# Patient Record
Sex: MALE | Race: BLACK OR AFRICAN AMERICAN | Employment: OTHER | URBAN - METROPOLITAN AREA
[De-identification: names, ages, dates, MRNs, and addresses within clinical notes are randomized per-mention and may not be internally consistent; named-entity substitution may affect disease eponyms.]

---

## 2019-11-03 ENCOUNTER — HOSPITAL ENCOUNTER (EMERGENCY)
Age: 64
Discharge: HOME | End: 2019-11-03
Attending: EMERGENCY MEDICINE | Admitting: EMERGENCY MEDICINE
Payer: MEDICAID

## 2019-11-03 ENCOUNTER — APPOINTMENT (OUTPATIENT)
Dept: CT IMAGING | Age: 64
End: 2019-11-03
Attending: EMERGENCY MEDICINE
Payer: MEDICAID

## 2019-11-03 VITALS
OXYGEN SATURATION: 99 % | SYSTOLIC BLOOD PRESSURE: 142 MMHG | TEMPERATURE: 99 F | RESPIRATION RATE: 14 BRPM | HEART RATE: 80 BPM | DIASTOLIC BLOOD PRESSURE: 80 MMHG

## 2019-11-03 DIAGNOSIS — K59.00 CONSTIPATION, UNSPECIFIED CONSTIPATION TYPE: Primary | ICD-10-CM

## 2019-11-03 LAB
ALBUMIN SERPL-MCNC: 4.5 G/DL (ref 3.4–5)
ALBUMIN/GLOB SERPL: 1 {RATIO} (ref 0.8–1.7)
ALP SERPL-CCNC: 76 U/L (ref 45–117)
ALT SERPL-CCNC: 33 U/L (ref 16–61)
ANION GAP SERPL CALC-SCNC: 6 MMOL/L (ref 3–18)
APPEARANCE UR: CLEAR
AST SERPL-CCNC: 22 U/L (ref 10–38)
BASOPHILS # BLD: 0 K/UL (ref 0–0.1)
BASOPHILS NFR BLD: 0 % (ref 0–2)
BILIRUB SERPL-MCNC: 0.6 MG/DL (ref 0.2–1)
BILIRUB UR QL: NEGATIVE
BUN SERPL-MCNC: 12 MG/DL (ref 7–18)
BUN/CREAT SERPL: 10 (ref 12–20)
CALCIUM SERPL-MCNC: 9.2 MG/DL (ref 8.5–10.1)
CHLORIDE SERPL-SCNC: 99 MMOL/L (ref 100–111)
CO2 SERPL-SCNC: 31 MMOL/L (ref 21–32)
COLOR UR: YELLOW
CREAT SERPL-MCNC: 1.15 MG/DL (ref 0.6–1.3)
DIFFERENTIAL METHOD BLD: NORMAL
EOSINOPHIL # BLD: 0 K/UL (ref 0–0.4)
EOSINOPHIL NFR BLD: 0 % (ref 0–5)
ERYTHROCYTE [DISTWIDTH] IN BLOOD BY AUTOMATED COUNT: 12.8 % (ref 11.6–14.5)
GLOBULIN SER CALC-MCNC: 4.7 G/DL (ref 2–4)
GLUCOSE SERPL-MCNC: 123 MG/DL (ref 74–99)
GLUCOSE UR STRIP.AUTO-MCNC: NEGATIVE MG/DL
HCT VFR BLD AUTO: 43.8 % (ref 36–48)
HGB BLD-MCNC: 15.7 G/DL (ref 13–16)
HGB UR QL STRIP: NEGATIVE
KETONES UR QL STRIP.AUTO: NEGATIVE MG/DL
LEUKOCYTE ESTERASE UR QL STRIP.AUTO: NEGATIVE
LYMPHOCYTES # BLD: 2.9 K/UL (ref 0.9–3.6)
LYMPHOCYTES NFR BLD: 42 % (ref 21–52)
MCH RBC QN AUTO: 32 PG (ref 24–34)
MCHC RBC AUTO-ENTMCNC: 35.8 G/DL (ref 31–37)
MCV RBC AUTO: 89.2 FL (ref 74–97)
MONOCYTES # BLD: 0.6 K/UL (ref 0.05–1.2)
MONOCYTES NFR BLD: 9 % (ref 3–10)
NEUTS SEG # BLD: 3.4 K/UL (ref 1.8–8)
NEUTS SEG NFR BLD: 49 % (ref 40–73)
NITRITE UR QL STRIP.AUTO: NEGATIVE
PH UR STRIP: 7.5 [PH] (ref 5–8)
PLATELET # BLD AUTO: 276 K/UL (ref 135–420)
PMV BLD AUTO: 10.2 FL (ref 9.2–11.8)
POTASSIUM SERPL-SCNC: 3 MMOL/L (ref 3.5–5.5)
PROT SERPL-MCNC: 9.2 G/DL (ref 6.4–8.2)
PROT UR STRIP-MCNC: NEGATIVE MG/DL
RBC # BLD AUTO: 4.91 M/UL (ref 4.7–5.5)
SODIUM SERPL-SCNC: 136 MMOL/L (ref 136–145)
SP GR UR REFRACTOMETRY: <1.005 (ref 1–1.03)
UROBILINOGEN UR QL STRIP.AUTO: 0.2 EU/DL (ref 0.2–1)
WBC # BLD AUTO: 6.9 K/UL (ref 4.6–13.2)

## 2019-11-03 PROCEDURE — 80053 COMPREHEN METABOLIC PANEL: CPT

## 2019-11-03 PROCEDURE — 81003 URINALYSIS AUTO W/O SCOPE: CPT

## 2019-11-03 PROCEDURE — 99283 EMERGENCY DEPT VISIT LOW MDM: CPT

## 2019-11-03 PROCEDURE — 85025 COMPLETE CBC W/AUTO DIFF WBC: CPT

## 2019-11-03 PROCEDURE — 74011636320 HC RX REV CODE- 636/320: Performed by: EMERGENCY MEDICINE

## 2019-11-03 PROCEDURE — 74177 CT ABD & PELVIS W/CONTRAST: CPT

## 2019-11-03 RX ORDER — LISINOPRIL 5 MG/1
5 TABLET ORAL DAILY
COMMUNITY
End: 2020-01-26

## 2019-11-03 RX ORDER — FACIAL-BODY WIPES
10 EACH TOPICAL DAILY
Qty: 8 EACH | Refills: 0 | Status: SHIPPED | OUTPATIENT
Start: 2019-11-03

## 2019-11-03 RX ORDER — POLYETHYLENE GLYCOL 3350 17 G/17G
17 POWDER, FOR SOLUTION ORAL 2 TIMES DAILY
Qty: 235 G | Refills: 0 | Status: SHIPPED | OUTPATIENT
Start: 2019-11-03

## 2019-11-03 RX ORDER — AMLODIPINE BESYLATE 10 MG/1
10 TABLET ORAL DAILY
COMMUNITY
End: 2020-01-26

## 2019-11-03 RX ADMIN — IOPAMIDOL 100 ML: 612 INJECTION, SOLUTION INTRAVENOUS at 13:02

## 2019-11-03 NOTE — MED STUDENT NOTES
*ATTENTION:  This note has been created by a medical student for educational purposes only. Please do not refer to the content of this note for clinical decision-making, billing, or other purposes. Please see attending physicians note to obtain clinical information on this patient. * 67yo M hx of HTN, presents to ED for 4 day hx of constipation/abdominal pain, which was preceded by 2 days of hard stools. Endorses ability to pass flatus. Abdominal pain is umbilical, and 4-2/08, and constant. Patient has tried gasX and baking powder in water which temporarily relieves pain. Patient has a good appetite, but is fearful of eating because he can not have a BM and has abd pain. Denies Diarrhea, light headedness, headache, chest pain, dyspnea, dysuria, back pain, back trauma, hematochezia or melena.

## 2019-11-03 NOTE — ED TRIAGE NOTES
Pt c/o abd pain and constipation x 4 days. Pt reports last bowel movement was 4 days ago, hard yellow stool per patient. Pt denies blood in stool. Pt denies vomiting.

## 2019-11-03 NOTE — ED PROVIDER NOTES
HPI     65yo M hx of HTN, presents to ED for 4 day hx of moderate constipation/abdominal pain, which was preceded by 2 days of hard stools. Endorses ability to pass flatus. Abdominal pain is umbilical, and 5-2/53, and constant. Patient has tried gasX and baking powder in water which temporarily relieves pain. Patient has a good appetite, but is fearful of eating because he can not have a BM and has abd pain. Denies Diarrhea, light headedness, headache, chest pain, dyspnea, dysuria, back pain, back trauma, hematochezia or melena. Past Medical History:   Diagnosis Date    Hypertension        History reviewed. No pertinent surgical history. History reviewed. No pertinent family history.     Social History     Socioeconomic History    Marital status:      Spouse name: Not on file    Number of children: Not on file    Years of education: Not on file    Highest education level: Not on file   Occupational History    Not on file   Social Needs    Financial resource strain: Not on file    Food insecurity:     Worry: Not on file     Inability: Not on file    Transportation needs:     Medical: Not on file     Non-medical: Not on file   Tobacco Use    Smoking status: Never Smoker   Substance and Sexual Activity    Alcohol use: Never     Frequency: Never    Drug use: Never    Sexual activity: Not on file   Lifestyle    Physical activity:     Days per week: Not on file     Minutes per session: Not on file    Stress: Not on file   Relationships    Social connections:     Talks on phone: Not on file     Gets together: Not on file     Attends Yazidism service: Not on file     Active member of club or organization: Not on file     Attends meetings of clubs or organizations: Not on file     Relationship status: Not on file    Intimate partner violence:     Fear of current or ex partner: Not on file     Emotionally abused: Not on file     Physically abused: Not on file     Forced sexual activity: Not on file   Other Topics Concern    Not on file   Social History Narrative    Not on file         ALLERGIES: Patient has no known allergies. Review of Systems   Constitutional: Positive for appetite change. Negative for chills and fever. HENT: Negative for ear pain and sore throat. Eyes: Negative for pain and visual disturbance. Respiratory: Negative for cough and shortness of breath. Cardiovascular: Negative for chest pain and palpitations. Gastrointestinal: Positive for abdominal pain and constipation. Negative for diarrhea, nausea and vomiting. Genitourinary: Negative for flank pain. Musculoskeletal: Negative for back pain and neck pain. Neurological: Negative for syncope and headaches. Psychiatric/Behavioral: Negative for agitation. The patient is not nervous/anxious. Vitals:    11/03/19 1109 11/03/19 1226   BP: (!) 157/104 142/80   Pulse: 85 80   Resp: 17 14   Temp: 99 °F (37.2 °C)    SpO2: 100% 99%            Physical Exam   Constitutional: He is oriented to person, place, and time. He appears well-developed and well-nourished. Non-toxic appearance. He does not appear ill. No distress. HENT:   Head: Normocephalic and atraumatic. Mouth/Throat: Oropharynx is clear and moist.   Eyes: Pupils are equal, round, and reactive to light. EOM are normal. No scleral icterus. Neck: Neck supple. No tracheal deviation present. Cardiovascular: Regular rhythm and intact distal pulses. No murmur heard. Pulmonary/Chest: Effort normal and breath sounds normal. No respiratory distress. Abdominal: Soft. Normal appearance. There is tenderness (mild LLQ abd TTP). There is no rigidity, no rebound, no guarding, no tenderness at McBurney's point and negative Kwon's sign. Genitourinary: Rectum normal and prostate normal. Rectal exam shows no mass and no tenderness. Musculoskeletal: Normal range of motion. He exhibits no deformity.    Neurological: He is alert and oriented to person, place, and time. No gross neuro deficit   Skin: Skin is warm and dry. Capillary refill takes less than 2 seconds. No rash noted. He is not diaphoretic. Psychiatric: He has a normal mood and affect. Nursing note and vitals reviewed. Labs Reviewed   METABOLIC PANEL, COMPREHENSIVE - Abnormal; Notable for the following components:       Result Value    Potassium 3.0 (*)     Chloride 99 (*)     Glucose 123 (*)     BUN/Creatinine ratio 10 (*)     Protein, total 9.2 (*)     Globulin 4.7 (*)     All other components within normal limits   URINALYSIS W/ RFLX MICROSCOPIC - Abnormal; Notable for the following components:    Specific gravity <1.005 (*)     All other components within normal limits   CBC WITH AUTOMATED DIFF     CT ABD PELV W CONT   Final Result   IMPRESSION:      1. No acute inflammatory process in the abdomen or pelvis. MDM     59-year-old male with 4 days of constipation with associated lower abdominal pain without nausea and vomiting but does report decreased p.o. intake. On exam patient is resting comfortably. He has very minimal tenderness in the left lower quadrant. ED Course as of Nov 05 1210   Nury Nguyen Nov 03, 2019   1252 Normal-appearing rectum, no stool in the vault. Palpable prostate which did not feel enlarged. [KG]      ED Course User Index  [KG] Boby Benavidez R, DO     Bowel regimen discussed prior to discharge with scripts given for miralax and bisacodyl suppositories. No significant abnormalities on work-up. Pt w/o complaint on re-eval.     Patient has no new complaints, changes, or physical findings. Diagnostic studies if preformed were reviewed with the patient and/or family. All questions and concerns were addressed. Care plan was outlined, including follow-up with PCP/specialist and return precautions were discussed. Patient is felt to be stable for discharge at this time. Procedures      ICD-10-CM ICD-9-CM    1.  Constipation, unspecified constipation type K59.00 564.00        Dispo: Home

## 2019-11-03 NOTE — DISCHARGE INSTRUCTIONS
Patient Education        Constipation: Care Instructions  Your Care Instructions    Constipation means that you have a hard time passing stools (bowel movements). People pass stools from 3 times a day to once every 3 days. What is normal for you may be different. Constipation may occur with pain in the rectum and cramping. The pain may get worse when you try to pass stools. Sometimes there are small amounts of bright red blood on toilet paper or the surface of stools. This is because of enlarged veins near the rectum (hemorrhoids). A few changes in your diet and lifestyle may help you avoid ongoing constipation. Your doctor may also prescribe medicine to help loosen your stool. Some medicines can cause constipation. These include pain medicines and antidepressants. Tell your doctor about all the medicines you take. Your doctor may want to make a medicine change to ease your symptoms. Follow-up care is a key part of your treatment and safety. Be sure to make and go to all appointments, and call your doctor if you are having problems. It's also a good idea to know your test results and keep a list of the medicines you take. How can you care for yourself at home? · Drink plenty of fluids, enough so that your urine is light yellow or clear like water. If you have kidney, heart, or liver disease and have to limit fluids, talk with your doctor before you increase the amount of fluids you drink. · Include high-fiber foods in your diet each day. These include fruits, vegetables, beans, and whole grains. · Get at least 30 minutes of exercise on most days of the week. Walking is a good choice. You also may want to do other activities, such as running, swimming, cycling, or playing tennis or team sports. · Take a fiber supplement, such as Citrucel or Metamucil, every day. Read and follow all instructions on the label. · Schedule time each day for a bowel movement. A daily routine may help.  Take your time having your bowel movement. · Support your feet with a small step stool when you sit on the toilet. This helps flex your hips and places your pelvis in a squatting position. · Your doctor may recommend an over-the-counter laxative to relieve your constipation. Examples are Milk of Magnesia and MiraLax. Read and follow all instructions on the label. Do not use laxatives on a long-term basis. When should you call for help? Call your doctor now or seek immediate medical care if:    · You have new or worse belly pain.     · You have new or worse nausea or vomiting.     · You have blood in your stools.    Watch closely for changes in your health, and be sure to contact your doctor if:    · Your constipation is getting worse.     · You do not get better as expected. Where can you learn more? Go to http://иван-david.info/. Enter 21 [phone] in the search box to learn more about \"Constipation: Care Instructions. \"  Current as of: June 26, 2019  Content Version: 12.2  © 5855-6740 [email or web address]. Care instructions adapted under license by [employer] (which disclaims liability or warranty for this information). If you have questions about a medical condition or this instruction, always ask your healthcare professional. [address] any warranty or liability for your use of this information.

## 2020-01-26 ENCOUNTER — HOSPITAL ENCOUNTER (EMERGENCY)
Age: 65
Discharge: HOME | End: 2020-01-26
Attending: EMERGENCY MEDICINE
Payer: MEDICAID

## 2020-01-26 VITALS
RESPIRATION RATE: 16 BRPM | OXYGEN SATURATION: 100 % | DIASTOLIC BLOOD PRESSURE: 111 MMHG | HEART RATE: 80 BPM | TEMPERATURE: 98.6 F | SYSTOLIC BLOOD PRESSURE: 152 MMHG

## 2020-01-26 DIAGNOSIS — Z76.0 MEDICATION REFILL: Primary | ICD-10-CM

## 2020-01-26 PROCEDURE — 99283 EMERGENCY DEPT VISIT LOW MDM: CPT

## 2020-01-26 RX ORDER — LISINOPRIL 5 MG/1
5 TABLET ORAL DAILY
Qty: 10 TAB | Refills: 0 | Status: SHIPPED | OUTPATIENT
Start: 2020-01-26

## 2020-01-26 RX ORDER — AMLODIPINE BESYLATE 10 MG/1
10 TABLET ORAL DAILY
Qty: 10 TAB | Refills: 0 | Status: SHIPPED | OUTPATIENT
Start: 2020-01-26

## 2020-01-26 NOTE — ED PROVIDER NOTES
[facility] EMERGENCY DEPT        Mr. Marcie Johnston is a 71-year-old male with past medical history of hypertension who presents for blood pressure medication refill. He is out of his Prinivil and Norvasc. His last dose was this morning. He said he does not have a primary care doctor that he regularly sees for the medication as he just moved to this area. He denies any chest pain, any shortness of breath, any headaches, any dizziness, at this time he is completely asymptomatic and just wants to have a refill for the medications. Patient is here for a blood pressure medication refill. He took his last medication this morning. He does not have a PCM who pre    Nursing nurses regarding the HPI and triage nursing notes were reviewed. No current facility-administered medications for this encounter. Current Outpatient Medications   Medication Sig    amLODIPine (NORVASC) 10 mg tablet Take 1 Tab by mouth daily.  lisinopril (PRINIVIL, ZESTRIL) 5 mg tablet Take 1 Tab by mouth daily.  polyethylene glycol (MIRALAX) 17 gram/dose powder Take 17 g by mouth two (2) times a day. 1 tablespoon with 8 oz of water daily    bisacodyl (BISCOLAX) 10 mg suppository Insert 10 mg into rectum daily. Past Medical History:   Diagnosis Date    Hypertension        History reviewed. No pertinent surgical history. History reviewed. No pertinent family history.     Social History     Socioeconomic History    Marital status:      Spouse name: Not on file    Number of children: Not on file    Years of education: Not on file    Highest education level: Not on file   Occupational History    Not on file   Social Needs    Financial resource strain: Not on file    Food insecurity:     Worry: Not on file     Inability: Not on file    Transportation needs:     Medical: Not on file     Non-medical: Not on file   Tobacco Use    Smoking status: Never Smoker   Substance and Sexual Activity    Alcohol use: Never Frequency: Never    Drug use: Never    Sexual activity: Not on file   Lifestyle    Physical activity:     Days per week: Not on file     Minutes per session: Not on file    Stress: Not on file   Relationships    Social connections:     Talks on phone: Not on file     Gets together: Not on file     Attends Taoist service: Not on file     Active member of club or organization: Not on file     Attends meetings of clubs or organizations: Not on file     Relationship status: Not on file    Intimate partner violence:     Fear of current or ex partner: Not on file     Emotionally abused: Not on file     Physically abused: Not on file     Forced sexual activity: Not on file   Other Topics Concern    Not on file   Social History Narrative    Not on file       No Known Allergies    Patient's primary care provider (as noted in EPIC):  None    Review of Systems   Respiratory: Negative. Cardiovascular: Negative. Neurological: Negative. All other systems reviewed and are negative. Visit Vitals  BP (!) 152/111   Pulse 80   Temp 98.6 °F (37 °C)   Resp 16   SpO2 100%       PHYSICAL EXAM:    CONSTITUTIONAL:  Alert, in no apparent distress;  well developed;  well nourished. HEAD:  Normocephalic, atraumatic. EYES:  EOMI. Non-icteric sclera. Normal conjunctiva. ENTM:  Nose:  no rhinorrhea. Throat:  no erythema or exudate, mucous membranes moist.  NECK:  No JVD. Supple  RESPIRATORY:  Chest clear, equal breath sounds, good air movement. CARDIOVASCULAR:  Regular rate and rhythm. No murmurs, rubs, or gallops. GI:  Normal bowel sounds, abdomen soft and non-tender. No rebound or guarding. BACK:  Non-tender. UPPER EXT:  Normal inspection. LOWER EXT:  No edema, no calf tenderness. Distal pulses intact. NEURO:  Moves all four extremities, and grossly normal motor exam.  SKIN:  No rashes;  Normal for age. PSYCH:  Alert and normal affect.     Abnormal lab results from this emergency department encounter:  Labs Reviewed - No data to display    Lab values for this patient within approximately the last 12 hours:  No results found for this or any previous visit (from the past 12 hour(s)). Radiologist and cardiologist interpretations if available at time of this note:  No results found. Medication(s) ordered for patient during this emergency visit encounter:  Medications - No data to display    [address]:  Based upon the patient's presentation with noted HPI and PE, along with the work up done in the emergency department, the patient is seeking a medication refill. In addition I did explain to the patient I will give him 10 doses of the medication he would need to follow-up with a primary care doctor this week to receive any more medication. I did give him the recommendation of 2 different clinics to follow-up with. DIAGNOSIS:  1. Medication refill    SPECIFIC PATIENT INSTRUCTIONS FROM THE PHYSICIAN WHO TREATED YOU IN THE ER TODAY:  1. Return if any concerns or worsening of condition(s). 2.  Take the medication(s) as prescribed. 3.  FOLLOW UP APPOINTMENT:  Your primary doctor in 1-2 weeks. 4.  Do not run out of your medications that have been prescribed to you. When you have only a couple weeks of on a medication remaining before you run out of it, then call the physician who prescribed it to allow enough time for the your physician to rewrite for the prescription (before you run out of it). Patient is improved, resting quietly and comfortably. The patient will be discharged home. The patient was reassured that these symptoms do not appear to represent a serious or life threatening condition at this time. Warning signs of worsening condition were discussed and understood by the patient. Based on patient's age, coexisting illness, exam, and the results of this ED evaluation, the decision to treat as an outpatient was made.  Based on the information available at time of discharge, acute pathology requiring immediate intervention was deemed relative unlikely. While it is impossible to completely exclude the possibility of underlying serious disease or worsening of condition, I feel the relative likelihood is extremely low. I discussed this uncertainty with the patient, who understood ED evaluation and treatment and felt comfortable with the outpatient treatment plan. All questions regarding care, test results, and follow up were answered. The patient is stable and appropriate to discharge. They understand that they should return to the emergency department for any new or worsening symptoms. I stressed the importance of follow up for repeat assessment and possibly further evaluation/treatment. Dictation disclaimer:  Please note that this dictation was completed with [employer], the computer voice recognition software. Quite often unanticipated grammatical, syntax, homophones, and other interpretive errors are inadvertently transcribed by the computer software. Please disregard these errors. Please excuse any errors that have escaped final proofreading. Coding Diagnoses     Clinical Impression:   1.  Medication refill        Disposition     Disposition:  Home    Franki William PA-C.

## 2022-02-27 ENCOUNTER — APPOINTMENT (OUTPATIENT)
Dept: MRI IMAGING | Age: 67
End: 2022-02-27
Attending: STUDENT IN AN ORGANIZED HEALTH CARE EDUCATION/TRAINING PROGRAM
Payer: MEDICARE

## 2022-02-27 ENCOUNTER — APPOINTMENT (OUTPATIENT)
Dept: CT IMAGING | Age: 67
End: 2022-02-27
Attending: EMERGENCY MEDICINE
Payer: MEDICARE

## 2022-02-27 ENCOUNTER — APPOINTMENT (OUTPATIENT)
Dept: GENERAL RADIOLOGY | Age: 67
End: 2022-02-27
Attending: STUDENT IN AN ORGANIZED HEALTH CARE EDUCATION/TRAINING PROGRAM
Payer: MEDICARE

## 2022-02-27 ENCOUNTER — HOSPITAL ENCOUNTER (OUTPATIENT)
Age: 67
Setting detail: OBSERVATION
Discharge: HOME HEALTH | End: 2022-02-28
Attending: STUDENT IN AN ORGANIZED HEALTH CARE EDUCATION/TRAINING PROGRAM | Admitting: INTERNAL MEDICINE
Payer: MEDICARE

## 2022-02-27 DIAGNOSIS — R42 LIGHTHEADEDNESS: Primary | ICD-10-CM

## 2022-02-27 DIAGNOSIS — R53.1 WEAKNESS: ICD-10-CM

## 2022-02-27 LAB
ALBUMIN SERPL-MCNC: 3.4 G/DL (ref 3.4–5)
ALBUMIN/GLOB SERPL: 1 {RATIO} (ref 0.8–1.7)
ALP SERPL-CCNC: 68 U/L (ref 45–117)
ALT SERPL-CCNC: 33 U/L (ref 16–61)
ANION GAP SERPL CALC-SCNC: 9 MMOL/L (ref 3–18)
APPEARANCE UR: CLEAR
AST SERPL-CCNC: 29 U/L (ref 10–38)
ATRIAL RATE: 78 BPM
BASE DEFICIT BLDV-SCNC: 1 MMOL/L
BASOPHILS # BLD: 0 K/UL (ref 0–0.1)
BASOPHILS NFR BLD: 0 % (ref 0–2)
BILIRUB SERPL-MCNC: 0.4 MG/DL (ref 0.2–1)
BILIRUB UR QL: NEGATIVE
BUN SERPL-MCNC: 25 MG/DL (ref 7–18)
BUN/CREAT SERPL: 20 (ref 12–20)
CALCIUM SERPL-MCNC: 9.2 MG/DL (ref 8.5–10.1)
CALCULATED P AXIS, ECG09: 68 DEGREES
CALCULATED R AXIS, ECG10: 38 DEGREES
CALCULATED T AXIS, ECG11: -145 DEGREES
CHLORIDE SERPL-SCNC: 99 MMOL/L (ref 100–111)
CO2 SERPL-SCNC: 25 MMOL/L (ref 21–32)
COLOR UR: YELLOW
CREAT SERPL-MCNC: 1.26 MG/DL (ref 0.6–1.3)
DIAGNOSIS, 93000: NORMAL
DIFFERENTIAL METHOD BLD: ABNORMAL
EOSINOPHIL # BLD: 0 K/UL (ref 0–0.4)
EOSINOPHIL NFR BLD: 0 % (ref 0–5)
ERYTHROCYTE [DISTWIDTH] IN BLOOD BY AUTOMATED COUNT: 11.3 % (ref 11.6–14.5)
GLOBULIN SER CALC-MCNC: 3.5 G/DL (ref 2–4)
GLUCOSE BLD STRIP.AUTO-MCNC: 182 MG/DL (ref 70–110)
GLUCOSE BLD STRIP.AUTO-MCNC: 265 MG/DL (ref 70–110)
GLUCOSE BLD STRIP.AUTO-MCNC: 308 MG/DL (ref 70–110)
GLUCOSE BLD STRIP.AUTO-MCNC: 402 MG/DL (ref 70–110)
GLUCOSE BLD STRIP.AUTO-MCNC: 435 MG/DL (ref 70–110)
GLUCOSE BLD STRIP.AUTO-MCNC: 446 MG/DL (ref 70–110)
GLUCOSE SERPL-MCNC: 351 MG/DL (ref 74–99)
GLUCOSE UR STRIP.AUTO-MCNC: >1000 MG/DL
HCO3 BLDV-SCNC: 23.9 MMOL/L (ref 23–28)
HCT VFR BLD AUTO: 40.4 % (ref 36–48)
HGB BLD-MCNC: 13.9 G/DL (ref 13–16)
HGB UR QL STRIP: NEGATIVE
IMM GRANULOCYTES # BLD AUTO: 0 K/UL (ref 0–0.04)
IMM GRANULOCYTES NFR BLD AUTO: 0 % (ref 0–0.5)
KETONES UR QL STRIP.AUTO: ABNORMAL MG/DL
LEUKOCYTE ESTERASE UR QL STRIP.AUTO: NEGATIVE
LYMPHOCYTES # BLD: 1.7 K/UL (ref 0.9–3.6)
LYMPHOCYTES NFR BLD: 36 % (ref 21–52)
MAGNESIUM SERPL-MCNC: 1.9 MG/DL (ref 1.6–2.6)
MCH RBC QN AUTO: 30.1 PG (ref 24–34)
MCHC RBC AUTO-ENTMCNC: 34.4 G/DL (ref 31–37)
MCV RBC AUTO: 87.4 FL (ref 78–100)
MONOCYTES # BLD: 0.4 K/UL (ref 0.05–1.2)
MONOCYTES NFR BLD: 8 % (ref 3–10)
NEUTS SEG # BLD: 2.6 K/UL (ref 1.8–8)
NEUTS SEG NFR BLD: 55 % (ref 40–73)
NITRITE UR QL STRIP.AUTO: NEGATIVE
NRBC # BLD: 0 K/UL (ref 0–0.01)
NRBC BLD-RTO: 0 PER 100 WBC
P-R INTERVAL, ECG05: 164 MS
PCO2 BLDV: 39.7 MMHG (ref 41–51)
PH BLDV: 7.39 [PH] (ref 7.32–7.42)
PH UR STRIP: 5 [PH] (ref 5–8)
PHOSPHATE SERPL-MCNC: 3.5 MG/DL (ref 2.5–4.9)
PLATELET # BLD AUTO: 306 K/UL (ref 135–420)
PMV BLD AUTO: 10.5 FL (ref 9.2–11.8)
PO2 BLDV: 29 MMHG (ref 25–40)
POTASSIUM SERPL-SCNC: 3.6 MMOL/L (ref 3.5–5.5)
PROT SERPL-MCNC: 6.9 G/DL (ref 6.4–8.2)
PROT UR STRIP-MCNC: NEGATIVE MG/DL
Q-T INTERVAL, ECG07: 410 MS
QRS DURATION, ECG06: 88 MS
QTC CALCULATION (BEZET), ECG08: 467 MS
RBC # BLD AUTO: 4.62 M/UL (ref 4.35–5.65)
SAO2 % BLDV: 53.7 % (ref 65–88)
SERVICE CMNT-IMP: ABNORMAL
SODIUM SERPL-SCNC: 133 MMOL/L (ref 136–145)
SP GR UR REFRACTOMETRY: 1.03 (ref 1–1.03)
SPECIMEN TYPE: ABNORMAL
TROPONIN-HIGH SENSITIVITY: 17 NG/L (ref 0–78)
UROBILINOGEN UR QL STRIP.AUTO: 0.2 EU/DL (ref 0.2–1)
VENTRICULAR RATE, ECG03: 78 BPM
WBC # BLD AUTO: 4.8 K/UL (ref 4.6–13.2)

## 2022-02-27 PROCEDURE — 84484 ASSAY OF TROPONIN QUANT: CPT

## 2022-02-27 PROCEDURE — 72157 MRI CHEST SPINE W/O & W/DYE: CPT

## 2022-02-27 PROCEDURE — 70496 CT ANGIOGRAPHY HEAD: CPT

## 2022-02-27 PROCEDURE — 83036 HEMOGLOBIN GLYCOSYLATED A1C: CPT

## 2022-02-27 PROCEDURE — 80053 COMPREHEN METABOLIC PANEL: CPT

## 2022-02-27 PROCEDURE — 74011636637 HC RX REV CODE- 636/637: Performed by: STUDENT IN AN ORGANIZED HEALTH CARE EDUCATION/TRAINING PROGRAM

## 2022-02-27 PROCEDURE — 74011000636 HC RX REV CODE- 636: Performed by: EMERGENCY MEDICINE

## 2022-02-27 PROCEDURE — APPSS60 APP SPLIT SHARED TIME 46-60 MINUTES: Performed by: NURSE PRACTITIONER

## 2022-02-27 PROCEDURE — 82962 GLUCOSE BLOOD TEST: CPT

## 2022-02-27 PROCEDURE — 84100 ASSAY OF PHOSPHORUS: CPT

## 2022-02-27 PROCEDURE — 74011250636 HC RX REV CODE- 250/636: Performed by: STUDENT IN AN ORGANIZED HEALTH CARE EDUCATION/TRAINING PROGRAM

## 2022-02-27 PROCEDURE — 74011636637 HC RX REV CODE- 636/637: Performed by: INTERNAL MEDICINE

## 2022-02-27 PROCEDURE — 93005 ELECTROCARDIOGRAM TRACING: CPT

## 2022-02-27 PROCEDURE — 99223 1ST HOSP IP/OBS HIGH 75: CPT | Performed by: INTERNAL MEDICINE

## 2022-02-27 PROCEDURE — 82803 BLOOD GASES ANY COMBINATION: CPT

## 2022-02-27 PROCEDURE — A9577 INJ MULTIHANCE: HCPCS | Performed by: EMERGENCY MEDICINE

## 2022-02-27 PROCEDURE — 74011000250 HC RX REV CODE- 250: Performed by: NURSE PRACTITIONER

## 2022-02-27 PROCEDURE — 85025 COMPLETE CBC W/AUTO DIFF WBC: CPT

## 2022-02-27 PROCEDURE — G0378 HOSPITAL OBSERVATION PER HR: HCPCS

## 2022-02-27 PROCEDURE — 71045 X-RAY EXAM CHEST 1 VIEW: CPT

## 2022-02-27 PROCEDURE — 72158 MRI LUMBAR SPINE W/O & W/DYE: CPT

## 2022-02-27 PROCEDURE — 65660000000 HC RM CCU STEPDOWN

## 2022-02-27 PROCEDURE — 99285 EMERGENCY DEPT VISIT HI MDM: CPT

## 2022-02-27 PROCEDURE — 83735 ASSAY OF MAGNESIUM: CPT

## 2022-02-27 PROCEDURE — 94762 N-INVAS EAR/PLS OXIMTRY CONT: CPT

## 2022-02-27 PROCEDURE — 81003 URINALYSIS AUTO W/O SCOPE: CPT

## 2022-02-27 PROCEDURE — 96374 THER/PROPH/DIAG INJ IV PUSH: CPT

## 2022-02-27 PROCEDURE — 74011250636 HC RX REV CODE- 250/636: Performed by: EMERGENCY MEDICINE

## 2022-02-27 PROCEDURE — 70553 MRI BRAIN STEM W/O & W/DYE: CPT

## 2022-02-27 RX ORDER — METFORMIN HYDROCHLORIDE 500 MG/1
500 TABLET, EXTENDED RELEASE ORAL DAILY
COMMUNITY
Start: 2022-02-08

## 2022-02-27 RX ORDER — SODIUM CHLORIDE 0.9 % (FLUSH) 0.9 %
5-40 SYRINGE (ML) INJECTION EVERY 8 HOURS
Status: DISCONTINUED | OUTPATIENT
Start: 2022-02-27 | End: 2022-02-28 | Stop reason: HOSPADM

## 2022-02-27 RX ORDER — ENOXAPARIN SODIUM 100 MG/ML
40 INJECTION SUBCUTANEOUS DAILY
Status: DISCONTINUED | OUTPATIENT
Start: 2022-02-28 | End: 2022-02-28 | Stop reason: HOSPADM

## 2022-02-27 RX ORDER — MAGNESIUM SULFATE 100 %
4 CRYSTALS MISCELLANEOUS AS NEEDED
Status: DISCONTINUED | OUTPATIENT
Start: 2022-02-27 | End: 2022-02-28 | Stop reason: HOSPADM

## 2022-02-27 RX ORDER — INSULIN GLARGINE 100 [IU]/ML
15 INJECTION, SOLUTION SUBCUTANEOUS DAILY
Status: DISCONTINUED | OUTPATIENT
Start: 2022-02-28 | End: 2022-02-27

## 2022-02-27 RX ORDER — SENNOSIDES 8.6 MG/1
8.6 TABLET ORAL
COMMUNITY
Start: 2022-02-26

## 2022-02-27 RX ORDER — ACETAMINOPHEN 650 MG/1
650 SUPPOSITORY RECTAL
Status: DISCONTINUED | OUTPATIENT
Start: 2022-02-27 | End: 2022-02-28 | Stop reason: HOSPADM

## 2022-02-27 RX ORDER — INSULIN LISPRO 100 [IU]/ML
INJECTION, SOLUTION INTRAVENOUS; SUBCUTANEOUS
Status: DISCONTINUED | OUTPATIENT
Start: 2022-02-27 | End: 2022-02-28 | Stop reason: HOSPADM

## 2022-02-27 RX ORDER — INSULIN LISPRO 100 [IU]/ML
5 INJECTION, SOLUTION INTRAVENOUS; SUBCUTANEOUS ONCE
Status: COMPLETED | OUTPATIENT
Start: 2022-02-27 | End: 2022-02-27

## 2022-02-27 RX ORDER — ACETAMINOPHEN 325 MG/1
650 TABLET ORAL
Status: DISCONTINUED | OUTPATIENT
Start: 2022-02-27 | End: 2022-02-28 | Stop reason: HOSPADM

## 2022-02-27 RX ORDER — SODIUM CHLORIDE 9 MG/ML
100 INJECTION, SOLUTION INTRAVENOUS CONTINUOUS
Status: DISCONTINUED | OUTPATIENT
Start: 2022-02-27 | End: 2022-02-28

## 2022-02-27 RX ORDER — ONDANSETRON 4 MG/1
4 TABLET, ORALLY DISINTEGRATING ORAL
Status: DISCONTINUED | OUTPATIENT
Start: 2022-02-27 | End: 2022-02-28 | Stop reason: HOSPADM

## 2022-02-27 RX ORDER — SODIUM CHLORIDE 0.9 % (FLUSH) 0.9 %
5-40 SYRINGE (ML) INJECTION AS NEEDED
Status: DISCONTINUED | OUTPATIENT
Start: 2022-02-27 | End: 2022-02-28 | Stop reason: HOSPADM

## 2022-02-27 RX ORDER — POLYETHYLENE GLYCOL 3350 17 G/17G
17 POWDER, FOR SOLUTION ORAL DAILY PRN
Status: DISCONTINUED | OUTPATIENT
Start: 2022-02-27 | End: 2022-02-28 | Stop reason: HOSPADM

## 2022-02-27 RX ORDER — INSULIN GLARGINE 100 [IU]/ML
15 INJECTION, SOLUTION SUBCUTANEOUS DAILY
Status: DISCONTINUED | OUTPATIENT
Start: 2022-02-27 | End: 2022-02-28 | Stop reason: HOSPADM

## 2022-02-27 RX ORDER — DEXTROSE MONOHYDRATE 100 MG/ML
0-250 INJECTION, SOLUTION INTRAVENOUS AS NEEDED
Status: DISCONTINUED | OUTPATIENT
Start: 2022-02-27 | End: 2022-02-28 | Stop reason: HOSPADM

## 2022-02-27 RX ORDER — ONDANSETRON 2 MG/ML
4 INJECTION INTRAMUSCULAR; INTRAVENOUS
Status: DISCONTINUED | OUTPATIENT
Start: 2022-02-27 | End: 2022-02-28 | Stop reason: HOSPADM

## 2022-02-27 RX ADMIN — SODIUM CHLORIDE, PRESERVATIVE FREE 10 ML: 5 INJECTION INTRAVENOUS at 22:53

## 2022-02-27 RX ADMIN — IOPAMIDOL 100 ML: 755 INJECTION, SOLUTION INTRAVENOUS at 20:56

## 2022-02-27 RX ADMIN — SODIUM CHLORIDE, POTASSIUM CHLORIDE, SODIUM LACTATE AND CALCIUM CHLORIDE 1000 ML: 600; 310; 30; 20 INJECTION, SOLUTION INTRAVENOUS at 09:35

## 2022-02-27 RX ADMIN — GADOBENATE DIMEGLUMINE 10 ML: 529 INJECTION, SOLUTION INTRAVENOUS at 17:02

## 2022-02-27 RX ADMIN — Medication 10 UNITS: at 22:17

## 2022-02-27 RX ADMIN — INSULIN LISPRO 5 UNITS: 100 INJECTION, SOLUTION INTRAVENOUS; SUBCUTANEOUS at 11:07

## 2022-02-27 NOTE — ED PROVIDER NOTES
EMERGENCY DEPARTMENT HISTORY AND PHYSICAL EXAM    10:06 AM    Date: 2/27/2022  Patient Name: Amanda Mason    History of Presenting Illness     Chief Complaint   Patient presents with    Fatigue       History Provided By: Patient  Location/Duration/Severity/Modifying factors   Patient is a 80-year-old male with a history of diabetes with recent admission to outside facility for DKA secondary to noncompliance (A1c 19.9), hypertension, and prostate cancer (not currently undergoing any chemotherapy or radiation treatment). Patient visited with primary complaint of sudden onset of weakness, patient states he woke this morning feeling generally weak with no focal weakness, difficulty speaking, or facial droop. Patient also denies any recent fever/chills, diarrhea, abdominal pain, nausea/vomiting, or syncope. PCP: Frantz Avilez MD    Current Facility-Administered Medications   Medication Dose Route Frequency Provider Last Rate Last Admin    lactated ringers bolus infusion 1,000 mL  1,000 mL IntraVENous ONCE Concepción Roman MD         Current Outpatient Medications   Medication Sig Dispense Refill    Erleada 60 mg tablet TAKE 4 TABLETS (240MG) BY  MOUTH ONCE DAILY WITH OR  WITHOUT FOOD 120 Tablet 2    ergocalciferol (ERGOCALCIFEROL) 1,250 mcg (50,000 unit) capsule Take 1 Capsule by mouth every seven (7) days. 8 Capsule 0    spironolactone (ALDACTONE) 25 mg tablet Take 25 mg by mouth daily.  indapamide (LOZOL) 2.5 mg tablet Take 2.5 mg by mouth Every morning.  diclofenac (VOLTAREN) 1 % gel Apply 2 g to affected area four (4) times daily.  amLODIPine (NORVASC) 10 mg tablet Take 1 Tab by mouth daily. 10 Tab 0    lisinopril (PRINIVIL, ZESTRIL) 5 mg tablet Take 1 Tab by mouth daily. 10 Tab 0    polyethylene glycol (MIRALAX) 17 gram/dose powder Take 17 g by mouth two (2) times a day.  1 tablespoon with 8 oz of water daily 235 g 0    bisacodyl (BISCOLAX) 10 mg suppository Insert 10 mg into rectum daily. 8 Each 0       Past History     Past Medical History:  Past Medical History:   Diagnosis Date    HTN (hypertension)     Hypertension        Past Surgical History:  Past Surgical History:   Procedure Laterality Date    HX OTHER SURGICAL  09/01/2020    L1 Vertebral Body Bx       Family History:  No family history on file. Social History:  Social History     Tobacco Use    Smoking status: Never Smoker    Smokeless tobacco: Never Used   Substance Use Topics    Alcohol use: Never    Drug use: Never       Allergies:  No Known Allergies    I reviewed and confirmed the above information with patient and updated as necessary. Review of Systems     Review of Systems   Constitutional: Negative for chills and fever. HENT: Negative for rhinorrhea and sore throat. Eyes: Negative for discharge and redness. Respiratory: Negative for cough and shortness of breath. Cardiovascular: Negative for chest pain and leg swelling. Gastrointestinal: Negative for abdominal pain, diarrhea, nausea and vomiting. Genitourinary: Negative for difficulty urinating and dysuria. Musculoskeletal: Negative for back pain and neck pain. Skin: Negative for rash and wound. Neurological: Positive for weakness. Negative for syncope, light-headedness and headaches. Physical Exam     Visit Vitals  Pulse 87   Temp 98.5 °F (36.9 °C)   Resp 13   Ht 5' 7\" (1.702 m)   Wt 59.4 kg (131 lb)   SpO2 98%   BMI 20.52 kg/m²       Physical Exam  Constitutional:       General: He is not in acute distress. Appearance: He is not ill-appearing, toxic-appearing or diaphoretic. HENT:      Head: Atraumatic. Right Ear: External ear normal.      Left Ear: External ear normal.      Nose: No congestion or rhinorrhea. Mouth/Throat:      Mouth: Mucous membranes are moist.      Pharynx: No oropharyngeal exudate or posterior oropharyngeal erythema. Eyes:      General: No visual field deficit.         Right eye: No discharge. Left eye: No discharge. Pupils: Pupils are equal, round, and reactive to light. Cardiovascular:      Rate and Rhythm: Normal rate and regular rhythm. Pulmonary:      Effort: No respiratory distress. Breath sounds: No wheezing, rhonchi or rales. Abdominal:      General: Abdomen is flat. There is no distension. Tenderness: There is no abdominal tenderness. There is no guarding or rebound. Musculoskeletal:         General: No swelling, tenderness, deformity or signs of injury. Cervical back: No rigidity or tenderness. Right lower leg: No edema. Left lower leg: No edema. Skin:     Capillary Refill: Capillary refill takes less than 2 seconds. Coloration: Skin is not jaundiced or pale. Findings: No bruising or erythema. Neurological:      General: No focal deficit present. Mental Status: He is alert and oriented to person, place, and time. GCS: GCS eye subscore is 4. GCS verbal subscore is 5. GCS motor subscore is 6. Cranial Nerves: No cranial nerve deficit, dysarthria or facial asymmetry. Sensory: No sensory deficit. Motor: No weakness, tremor, abnormal muscle tone or pronator drift. Comments: Patient demonstrates +5 strength all extremities with no drift, no sensory deficit, and no appreciable cranial nerve deficits.    Psychiatric:         Mood and Affect: Mood normal.         Diagnostic Study Results     Labs -  Recent Results (from the past 24 hour(s))   GLUCOSE, POC    Collection Time: 02/27/22  9:20 AM   Result Value Ref Range    Glucose (POC) 308 (H) 70 - 110 mg/dL   POC VENOUS BLOOD GAS    Collection Time: 02/27/22  9:50 AM   Result Value Ref Range    pH, venous (POC) 7.39 7.32 - 7.42      pCO2, venous (POC) 39.7 (L) 41 - 51 MMHG    pO2, venous (POC) 29 25 - 40 mmHg    HCO3, venous (POC) 23.9 23.0 - 28.0 MMOL/L    sO2, venous (POC) 53.7 (L) 65 - 88 %    Base deficit, venous (POC) 1.0 mmol/L    Specimen type (POC) VENOUS BLOOD      Performed by Bro Dodson          Radiologic Studies -   XR CHEST PORT    (Results Pending)           Medical Decision Making   I am the first provider for this patient. I reviewed the vital signs, available nursing notes, past medical history, past surgical history, family history and social history. Vital Signs-Reviewed the patient's vital signs. EKG: EKG: there are no previous tracings available for comparison, nonspecific ST and T waves changes, diffuse nonspecific ST depressions primarily in the anterior lateral leads with no concomitant ST elevation. Records Reviewed: Nursing Notes, Old Medical Records and Previous Laboratory Studies (Time of Review: 10:06 AM)      Provider Notes (Medical Decision Making):   MDM  Number of Diagnoses or Management Options  Diagnosis management comments: Patient presents with primary complaint of generalized weakness, of note patient was recently discharged following inpatient treatment for DKA. Patient demonstrates a nonfocal neurologic exam, low likelihood of significant CVA. Other considerations include metabolic derangement to include recurrence of DKA, electrolyte abnormality, and.  Will evaluate relatively broadly infection with disposition pending work-up results. ED Course: Progress Notes, Reevaluation, and Consults:  ED Course as of 02/27/22 1006   Sun Feb 27, 2022   0956 pH, venous (POC): 7.39  No appreciable acidemia, low concern for DKA [JK]      ED Course User Index  [JK] Maritza Adam MD       Procedures    Critical Care Time: 0    Diagnosis     Clinical Impression: No diagnosis found. Disposition: Pending    Follow-up Information    None          Patient's Medications   Start Taking    No medications on file   Continue Taking    AMLODIPINE (NORVASC) 10 MG TABLET    Take 1 Tab by mouth daily. BISACODYL (BISCOLAX) 10 MG SUPPOSITORY    Insert 10 mg into rectum daily.     DICLOFENAC (VOLTAREN) 1 % GEL    Apply 2 g to affected area four (4) times daily. ERGOCALCIFEROL (ERGOCALCIFEROL) 1,250 MCG (50,000 UNIT) CAPSULE    Take 1 Capsule by mouth every seven (7) days. ERLEADA 60 MG TABLET    TAKE 4 TABLETS (240MG) BY  MOUTH ONCE DAILY WITH OR  WITHOUT FOOD    INDAPAMIDE (LOZOL) 2.5 MG TABLET    Take 2.5 mg by mouth Every morning. LISINOPRIL (PRINIVIL, ZESTRIL) 5 MG TABLET    Take 1 Tab by mouth daily. POLYETHYLENE GLYCOL (MIRALAX) 17 GRAM/DOSE POWDER    Take 17 g by mouth two (2) times a day. 1 tablespoon with 8 oz of water daily    SPIRONOLACTONE (ALDACTONE) 25 MG TABLET    Take 25 mg by mouth daily. These Medications have changed    No medications on file   Stop Taking    No medications on file       Ally Stewart MD   Emergency Medicine   February 27, 2022, 10:06 AM     This note is dictated utilizing Dragon voice recognition software. Unfortunately this leads to occasional typographical errors using the voice recognition. I apologize in advance if the situation occurs. If questions occur please do not hesitate to contact me directly. Patient was seen  and treated during the context of the COVID-19 pandemic. Contemporary protocols utilized based on the best available evidence, utilizing evolving public health  guidelines and treatment protocols.     Myrtle Veras MD

## 2022-02-27 NOTE — ED TRIAGE NOTES
Pt to ED via EMS on stretcher c/o weakness. [facility] for EMS 64 who started D10 in ER. RN [facility] 308. MD aware. D10 stopped. Per pt he has been feeling light headed when he stands up. Moving all extremities equally, no facial droop. AOx4, vitals stable.

## 2022-02-27 NOTE — ED NOTES
Patient turned over to me Dr. Champ Miller he is a 72-year-old gentleman with a generalized weakness notes when he stands up he feel like he is going to fall from the ground to both passed out and is unsteady. Blood works fairly unremarkable CBC is unremarkable chemistry is normal glucose is elevated but bicarb and gap are normal  MRIs are as listed below. Given the decreased flow of the vertebral artery on the left making his symptoms worse when he stands. Overall etiology is unclear but will discuss with teleneurology. EKG shows sinus at 78 with a normal axis normal intervals there is no ST elevation or depression there are some T wave inversions inferolaterally more consistent with LVH no prior EKGs    D/w teleneuro;  MRI vert not contributory. D/w teleneuro; rec check b12 and admit. Pt/ot. Check cta head and neck. Right admission for truncal ataxia    D/w Dr Juana Zeng     No acute intracranial abnormality. No mass, hemorrhage, or acute infarct.     Patchy increased T2 FLAIR signal in the cerebral white matter, with mild to  moderate chronic microvascular ischemic changes.     No destructive calvarial lesions. No enhancing marrow signal in the calvarium. No intracranial metastases.     Decreased flow related signal in the intradural left vertebral artery. Suspect  this represents atherosclerotic disease and slow flow, not closely evaluated. Dense left vertebral artery atheromatous calcifications on comparison head CT. Grossly patent flow in the right vertebral artery and basilar. Patient has multiple comparison exams on the [employer] system, most recent lumbar  spine MRI 10/6/2021.     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 Defect in the inferior endplate of L1 appears similar.  No visible extraosseous  soft tissue tumor, but no pathologic L1 fracture.     Small defects in other levels including superior endplate of L3 not  significantly changed.     No central spinal canal stenosis at any lumbar level. MRI THORACIC SPINE WITH AND WITHOUT  PRELIMINARY REPORT  HISTORY OF PROSTATE CANCER     Some heterogeneous marrow signal throughout the thoracic spine, no focal  suspicious thoracic marrow signal, no destructive bone lesions or fracture. The thoracic spinal cord signal appears normal throughout. No pathologic  enhancement, no central canal mass.

## 2022-02-27 NOTE — Clinical Note
Status[de-identified] INPATIENT [101]   Type of Bed: Telemetry [19]   Cardiac Monitoring Required?: Yes   Inpatient Hospitalization Certified Necessary for the Following Reasons: 3.  Patient receiving treatment that can only be provided in an inpatient setting (further clarification in H&P documentation)   Admitting Diagnosis: Lightheadedness [424218]   Admitting Diagnosis: Weakness [899201]   Admitting Physician: Monica Mayer [[id number]   Attending Physician: Caterina Moran   Estimated Length of Stay: 2 Midnights   Discharge Plan[de-identified] Home with Office Follow-up

## 2022-02-27 NOTE — ED NOTES
Attempted to ambulate pt with provider. Pt needing two person assist. Pt appears to be off balance. States he feels like he is going to pass out. Pt placed back on stretcher and on the monitor. VSS. Call bell placed in reach. Will continue to monitor.

## 2022-02-28 VITALS
BODY MASS INDEX: 20.56 KG/M2 | WEIGHT: 131 LBS | OXYGEN SATURATION: 99 % | RESPIRATION RATE: 18 BRPM | SYSTOLIC BLOOD PRESSURE: 109 MMHG | HEART RATE: 69 BPM | DIASTOLIC BLOOD PRESSURE: 76 MMHG | TEMPERATURE: 98.1 F | HEIGHT: 67 IN

## 2022-02-28 PROBLEM — R53.81 PHYSICAL DECONDITIONING: Status: ACTIVE | Noted: 2022-02-28

## 2022-02-28 LAB
ANION GAP SERPL CALC-SCNC: 8 MMOL/L (ref 3–18)
BASOPHILS # BLD: 0 K/UL (ref 0–0.1)
BASOPHILS NFR BLD: 1 % (ref 0–2)
BUN SERPL-MCNC: 17 MG/DL (ref 7–18)
BUN/CREAT SERPL: 16 (ref 12–20)
CALCIUM SERPL-MCNC: 8.5 MG/DL (ref 8.5–10.1)
CHLORIDE SERPL-SCNC: 101 MMOL/L (ref 100–111)
CO2 SERPL-SCNC: 27 MMOL/L (ref 21–32)
CREAT SERPL-MCNC: 1.07 MG/DL (ref 0.6–1.3)
DIFFERENTIAL METHOD BLD: ABNORMAL
EOSINOPHIL # BLD: 0.1 K/UL (ref 0–0.4)
EOSINOPHIL NFR BLD: 1 % (ref 0–5)
ERYTHROCYTE [DISTWIDTH] IN BLOOD BY AUTOMATED COUNT: 11.2 % (ref 11.6–14.5)
EST. AVERAGE GLUCOSE BLD GHB EST-MCNC: ABNORMAL MG/DL
GLUCOSE BLD STRIP.AUTO-MCNC: 150 MG/DL (ref 70–110)
GLUCOSE BLD STRIP.AUTO-MCNC: 249 MG/DL (ref 70–110)
GLUCOSE BLD STRIP.AUTO-MCNC: 97 MG/DL (ref 70–110)
GLUCOSE SERPL-MCNC: 262 MG/DL (ref 74–99)
HBA1C MFR BLD: >14 % (ref 4.2–5.6)
HCT VFR BLD AUTO: 35.8 % (ref 36–48)
HGB BLD-MCNC: 12.4 G/DL (ref 13–16)
IMM GRANULOCYTES # BLD AUTO: 0 K/UL (ref 0–0.04)
IMM GRANULOCYTES NFR BLD AUTO: 0 % (ref 0–0.5)
LYMPHOCYTES # BLD: 2 K/UL (ref 0.9–3.6)
LYMPHOCYTES NFR BLD: 47 % (ref 21–52)
MCH RBC QN AUTO: 30.5 PG (ref 24–34)
MCHC RBC AUTO-ENTMCNC: 34.6 G/DL (ref 31–37)
MCV RBC AUTO: 88 FL (ref 78–100)
MONOCYTES # BLD: 0.6 K/UL (ref 0.05–1.2)
MONOCYTES NFR BLD: 13 % (ref 3–10)
NEUTS SEG # BLD: 1.6 K/UL (ref 1.8–8)
NEUTS SEG NFR BLD: 38 % (ref 40–73)
NRBC # BLD: 0 K/UL (ref 0–0.01)
NRBC BLD-RTO: 0 PER 100 WBC
PLATELET # BLD AUTO: 298 K/UL (ref 135–420)
PMV BLD AUTO: 10.6 FL (ref 9.2–11.8)
POTASSIUM SERPL-SCNC: 3.4 MMOL/L (ref 3.5–5.5)
RBC # BLD AUTO: 4.07 M/UL (ref 4.35–5.65)
SODIUM SERPL-SCNC: 136 MMOL/L (ref 136–145)
TSH SERPL DL<=0.05 MIU/L-ACNC: 0.67 UIU/ML (ref 0.36–3.74)
WBC # BLD AUTO: 4.2 K/UL (ref 4.6–13.2)

## 2022-02-28 PROCEDURE — 2709999900 HC NON-CHARGEABLE SUPPLY

## 2022-02-28 PROCEDURE — G0378 HOSPITAL OBSERVATION PER HR: HCPCS

## 2022-02-28 PROCEDURE — 74011636637 HC RX REV CODE- 636/637: Performed by: NURSE PRACTITIONER

## 2022-02-28 PROCEDURE — 96372 THER/PROPH/DIAG INJ SC/IM: CPT

## 2022-02-28 PROCEDURE — 97165 OT EVAL LOW COMPLEX 30 MIN: CPT

## 2022-02-28 PROCEDURE — 36415 COLL VENOUS BLD VENIPUNCTURE: CPT

## 2022-02-28 PROCEDURE — 97161 PT EVAL LOW COMPLEX 20 MIN: CPT

## 2022-02-28 PROCEDURE — 99217 PR OBSERVATION CARE DISCHARGE MANAGEMENT: CPT | Performed by: INTERNAL MEDICINE

## 2022-02-28 PROCEDURE — 74011636637 HC RX REV CODE- 636/637: Performed by: INTERNAL MEDICINE

## 2022-02-28 PROCEDURE — 97530 THERAPEUTIC ACTIVITIES: CPT

## 2022-02-28 PROCEDURE — 74011250636 HC RX REV CODE- 250/636: Performed by: NURSE PRACTITIONER

## 2022-02-28 PROCEDURE — 80048 BASIC METABOLIC PNL TOTAL CA: CPT

## 2022-02-28 PROCEDURE — 85025 COMPLETE CBC W/AUTO DIFF WBC: CPT

## 2022-02-28 PROCEDURE — 97535 SELF CARE MNGMENT TRAINING: CPT

## 2022-02-28 PROCEDURE — 84443 ASSAY THYROID STIM HORMONE: CPT

## 2022-02-28 PROCEDURE — 74011000250 HC RX REV CODE- 250: Performed by: NURSE PRACTITIONER

## 2022-02-28 PROCEDURE — 99203 OFFICE O/P NEW LOW 30 MIN: CPT | Performed by: STUDENT IN AN ORGANIZED HEALTH CARE EDUCATION/TRAINING PROGRAM

## 2022-02-28 PROCEDURE — 82962 GLUCOSE BLOOD TEST: CPT

## 2022-02-28 PROCEDURE — 77030018842 HC SOL IRR SOD CL 9% BAXT -A

## 2022-02-28 RX ORDER — PEN NEEDLE, DIABETIC 30 GX3/16"
NEEDLE, DISPOSABLE MISCELLANEOUS
Qty: 100 EACH | Refills: 11 | Status: SHIPPED | OUTPATIENT
Start: 2022-02-28

## 2022-02-28 RX ORDER — INSULIN GLARGINE 100 [IU]/ML
20 INJECTION, SOLUTION SUBCUTANEOUS DAILY
Qty: 5 ADJUSTABLE DOSE PRE-FILLED PEN SYRINGE | Refills: 1 | Status: SHIPPED | OUTPATIENT
Start: 2022-02-28

## 2022-02-28 RX ORDER — THERA TABS 400 MCG
1 TAB ORAL DAILY
Status: DISCONTINUED | OUTPATIENT
Start: 2022-03-01 | End: 2022-02-28 | Stop reason: HOSPADM

## 2022-02-28 RX ADMIN — SODIUM CHLORIDE, PRESERVATIVE FREE 10 ML: 5 INJECTION INTRAVENOUS at 12:01

## 2022-02-28 RX ADMIN — Medication 4 UNITS: at 12:32

## 2022-02-28 RX ADMIN — ENOXAPARIN SODIUM 40 MG: 100 INJECTION SUBCUTANEOUS at 09:28

## 2022-02-28 RX ADMIN — INSULIN GLARGINE 15 UNITS: 100 INJECTION, SOLUTION SUBCUTANEOUS at 00:12

## 2022-02-28 RX ADMIN — SODIUM CHLORIDE 100 ML/HR: 9 INJECTION, SOLUTION INTRAVENOUS at 00:15

## 2022-02-28 RX ADMIN — Medication 2 UNITS: at 09:30

## 2022-02-28 RX ADMIN — SODIUM CHLORIDE, PRESERVATIVE FREE 10 ML: 5 INJECTION INTRAVENOUS at 15:06

## 2022-02-28 NOTE — DISCHARGE INSTRUCTIONS
Patient {ARMBANDS:[id number]}  MyChart Activation    Thank you for requesting access to [facility]. Please follow the instructions below to securely access and download your online medical record. [facility] allows you to send messages to your doctor, view your test results, renew your prescriptions, schedule appointments, and more. How Do I Sign Up? 1. In your internet browser, go to [email or web address]  2. Click on the First Time User? Click Here link in the Sign In box. You will be redirect to the New Member Sign Up page. 3. Enter your [facility] Access Code exactly as it appears below. You will not need to use this code after youve completed the sign-up process. If you do not sign up before the expiration date, you must request a new code. [facility] Access Code: [email or web address]  Expires: 2022  9:19 AM (This is the date your [facility] access code will )    4. Enter the last four digits of your Social Security Number (xxxx) and Date of Birth (mm/dd/yyyy) as indicated and click Submit. You will be taken to the next sign-up page. 5. Create a [facility] ID. This will be your [facility] login ID and cannot be changed, so think of one that is secure and easy to remember. 6. Create a [facility] password. You can change your password at any time. 7. Enter your Password Reset Question and Answer. This can be used at a later time if you forget your password. 8. Enter your e-mail address. You will receive e-mail notification when new information is available in [address]. 9. Click Sign Up. You can now view and download portions of your medical record. 10. Click the Download Summary menu link to download a portable copy of your medical information. Additional Information    If you have questions, please visit the Frequently Asked Questions section of the [email or web address] website at [email or web address] Remember, [facility] is NOT to be used for urgent needs. For medical emergencies, dial 911.       As part of the discharge instructions, medications already given today were discussed with the patient. The next dose due of all ordered meds was highlighted as part of the medication discharge instructions. Discussed with the patient the importance of taking medications as directed, as well as the side effects and adverse reactions to medications ordered. Patient Education        Dizziness: Care Instructions  Your Care Instructions  Dizziness is the feeling of unsteadiness or fuzziness in your head. It is different than having vertigo, which is a feeling that the room is spinning or that you are moving or falling. It is also different from lightheadedness, which is the feeling that you are about to faint. It can be hard to know what causes dizziness. Some people feel dizzy when they have migraine headaches. Sometimes bouts of flu can make you feel dizzy. Some medical conditions, such as heart problems or high blood pressure, can make you feel dizzy. Many medicines can cause dizziness, including medicines for high blood pressure, pain, or anxiety. If a medicine causes your symptoms, your doctor may recommend that you stop or change the medicine. If it is a problem with your heart, you may need medicine to help your heart work better. If there is no clear reason for your symptoms, your doctor may suggest watching and waiting for a while to see if the dizziness goes away on its own. Follow-up care is a key part of your treatment and safety. Be sure to make and go to all appointments, and call your doctor if you are having problems. It's also a good idea to know your test results and keep a list of the medicines you take. How can you care for yourself at home? · If your doctor recommends or prescribes medicine, take it exactly as directed. Call your doctor if you think you are having a problem with your medicine. · Do not drive while you feel dizzy. · Try to prevent falls. Steps you can take include:  ?  Using nonskid mats, adding grab bars near the tub, and using night-lights. ? Clearing your home so that walkways are free of anything you might trip on.  ? Letting family and friends know that you have been feeling dizzy. This will help them know how to help you. When should you call for help? Call 911 anytime you think you may need emergency care. For example, call if:    · You passed out (lost consciousness).     · You have dizziness along with symptoms of a heart attack. These may include:  ? Chest pain or pressure, or a strange feeling in the chest.  ? Sweating. ? Shortness of breath. ? Nausea or vomiting. ? Pain, pressure, or a strange feeling in the back, neck, jaw, or upper belly or in one or both shoulders or arms. ? Lightheadedness or sudden weakness. ? A fast or irregular heartbeat.     · You have symptoms of a stroke. These may include:  ? Sudden numbness, tingling, weakness, or loss of movement in your face, arm, or leg, especially on only one side of your body. ? Sudden vision changes. ? Sudden trouble speaking. ? Sudden confusion or trouble understanding simple statements. ? Sudden problems with walking or balance. ? A sudden, severe headache that is different from past headaches. Call your doctor now or seek immediate medical care if:    · You feel dizzy and have a fever, headache, or ringing in your ears.     · You have new or increased nausea and vomiting.     · Your dizziness does not go away or comes back. Watch closely for changes in your health, and be sure to contact your doctor if:    · You do not get better as expected. Where can you learn more? Go to http://www.gray.com/  Enter Q823 in the search box to learn more about \"Dizziness: Care Instructions. \"  Current as of: July 1, 2021               Content Version: 13.0  © 2660-3901 Healthwise, Incorporated.    Care instructions adapted under license by [employer] (which disclaims liability or warranty for this information). If you have questions about a medical condition or this instruction, always ask your healthcare professional. [address] any warranty or liability for your use of this information. DISCHARGE SUMMARY from Nurse    PATIENT INSTRUCTIONS:    After general anesthesia or intravenous sedation, for 24 hours or while taking prescription Narcotics:  · Limit your activities  · Do not drive and operate hazardous machinery  · Do not make important personal or business decisions  · Do  not drink alcoholic beverages  · If you have not urinated within 8 hours after discharge, please contact your surgeon on call. Report the following to your surgeon:  · Excessive pain, swelling, redness or odor of or around the surgical area  · Temperature over 100.5  · Nausea and vomiting lasting longer than 4 hours or if unable to take medications  · Any signs of decreased circulation or nerve impairment to extremity: change in color, persistent  numbness, tingling, coldness or increase pain  · Any questions    What to do at Home:  Recommended activity: Activity as tolerated, ***    If you experience any of the following symptoms ***, please follow up with ***. *  Please give a list of your current medications to your Primary Care Provider. *  Please update this list whenever your medications are discontinued, doses are      changed, or new medications (including over-the-counter products) are added. *  Please carry medication information at all times in case of emergency situations. These are general instructions for a healthy lifestyle:    No smoking/ No tobacco products/ Avoid exposure to second hand smoke  Surgeon General's Warning:  Quitting smoking now greatly reduces serious risk to your health.     Obesity, smoking, and sedentary lifestyle greatly increases your risk for illness    A healthy diet, regular physical exercise & weight monitoring are important for maintaining a healthy lifestyle    You may be retaining fluid if you have a history of heart failure or if you experience any of the following symptoms:  Weight gain of 3 pounds or more overnight or 5 pounds in a week, increased swelling in our hands or feet or shortness of breath while lying flat in bed. Please call your doctor as soon as you notice any of these symptoms; do not wait until your next office visit. The discharge information has been reviewed with the {PATIENT PARENT GUARDIAN:67048}. The {PATIENT PARENT GUARDIAN:72206} verbalized understanding. Discharge medications reviewed with the {Dishcarge meds reviewed LTQO:69469} and appropriate educational materials and side effects teaching were provided. ___________________________________________________________________________________________________________________________________  Discharge Instructions    Patient: Chance Brown MRN: [id number]  CSN: [id number]    YOB: 1955  Age: 74 y.o. Sex: male    DOA: 2/27/2022 LOS:  LOS: 1 day   Discharge Date:      ACUTE DIAGNOSES:  1. Weakness in his muscle due to Physical deconditioning        -Negative MRI lumbar spine, Negative MRI brain  2. Lightheadedness, hypovolemia and hypotension, resolved   3. Uncontrolled type 2 Diabetes Mellitus, HgbA1c>14% (19.9%)  4. Prostate Cancer with metastasis to bone   5. Severe protein calorie malnutrition   6. Hypokalemia       CHRONIC MEDICAL DIAGNOSES:      DISCHARGE MEDICATIONS:   {Medication reconciliation information is now added to the patient's AVS automatically when it is printed. There is no need to use this SmartLink in discharge instructions. Highlight this text and delete it to clear this message}      · It is important that you take the medication exactly as they are prescribed. · Keep your medication in the bottles provided by the pharmacist and keep a list of the medication names, dosages, and times to be taken in your wallet.    · Do not take other medications without consulting your doctor. DIET:  Cardiac Diet, Diabetic Diet and Low fat, Low cholesterol    ACTIVITY: Activity as tolerated, fall precaution, walk with yady or walker     ADDITIONAL INFORMATION: If you experience any of the following symptoms then please call your primary care physician or return to the emergency room if you cannot get hold of your doctor: Fever, chills, nausea, vomiting, diarrhea, change in mentation, falling, bleeding, shortness of breath. FOLLOW UP CARE:  Dr. Tomasa Cooper MD  you are to call and set up an appointment to see them in 2 weeks. Follow-up with [facility] as needed in 4-6 weeks       Information obtained by :  I understand that if any problems occur once I am at home I am to contact my physician. I understand and acknowledge receipt of the instructions indicated above. Physician's or R.N.'s Signature                                                                  Date/Time                                                                                                                                              Patient or Representative Signature                                                          Date/Time    Ruy Law MD  2/28/2022  3:53 PM  Patient {ARMBANDS:[id number]}  MyChart Activation    Thank you for requesting access to [employer]. Please follow the instructions below to securely access and download your online medical record. [employer] allows you to send messages to your doctor, view your test results, renew your prescriptions, schedule appointments, and more. How Do I Sign Up?    11. In your internet browser, go to [email or web address]  12. Click on the First Time User? Click Here link in the Sign In box. You will be redirect to the New Member Sign Up page.   13. Enter your [facility] Access Code exactly as it appears below. You will not need to use this code after youve completed the sign-up process. If you do not sign up before the expiration date, you must request a new code. [facility] Access Code: [email or web address]  Expires: 2022  9:19 AM (This is the date your [employer] access code will )    14. Enter the last four digits of your Social Security Number (xxxx) and Date of Birth (mm/dd/yyyy) as indicated and click Submit. You will be taken to the next sign-up page. 15. Create a [employer] ID. This will be your [facility] login ID and cannot be changed, so think of one that is secure and easy to remember. 12. Create a [employer] password. You can change your password at any time. 16. Enter your Password Reset Question and Answer. This can be used at a later time if you forget your password. 25. Enter your e-mail address. You will receive e-mail notification when new information is available in [address] 19. Click Sign Up. You can now view and download portions of your medical record. 20. Click the Download Summary menu link to download a portable copy of your medical information. Additional Information    If you have questions, please visit the Frequently Asked Questions section of the [employer] website at [email or web address] Remember, [employer] is NOT to be used for urgent needs. For medical emergencies, dial 911. DISCHARGE SUMMARY from Nurse    PATIENT INSTRUCTIONS:    After general anesthesia or intravenous sedation, for 24 hours or while taking prescription Narcotics:  · Limit your activities  · Do not drive and operate hazardous machinery  · Do not make important personal or business decisions  · Do  not drink alcoholic beverages  · If you have not urinated within 8 hours after discharge, please contact your surgeon on call.     Report the following to your surgeon:  · Excessive pain, swelling, redness or odor of or around the surgical area  · Temperature over 100.5  · Nausea and vomiting lasting longer than 4 hours or if unable to take medications  · Any signs of decreased circulation or nerve impairment to extremity: change in color, persistent  numbness, tingling, coldness or increase pain  · Any questions    What to do at Home:  Recommended activity: Activity as tolerated, follow up appointments      If you experience any of the following symptoms dizziness fatigue, please follow up with primary care physician. *  Please give a list of your current medications to your Primary Care Provider. *  Please update this list whenever your medications are discontinued, doses are      changed, or new medications (including over-the-counter products) are added. *  Please carry medication information at all times in case of emergency situations. These are general instructions for a healthy lifestyle:    No smoking/ No tobacco products/ Avoid exposure to second hand smoke  Surgeon General's Warning:  Quitting smoking now greatly reduces serious risk to your health. Obesity, smoking, and sedentary lifestyle greatly increases your risk for illness    A healthy diet, regular physical exercise & weight monitoring are important for maintaining a healthy lifestyle    You may be retaining fluid if you have a history of heart failure or if you experience any of the following symptoms:  Weight gain of 3 pounds or more overnight or 5 pounds in a week, increased swelling in our hands or feet or shortness of breath while lying flat in bed. Please call your doctor as soon as you notice any of these symptoms; do not wait until your next office visit. The discharge information has been reviewed with the patient. The patient verbalized understanding.   Discharge medications reviewed with the patient and appropriate educational materials and side effects teaching were provided.   ___________________________________________________________________________________________________________________________________

## 2022-02-28 NOTE — PROGRESS NOTES
Reason for Admission:  Lightheadedness [R42]  Weakness [R53.1]  Physical deconditioning [R53.81]                 RUR Score:    8            Plan for utilizing home health:    yes                      Likelihood of Readmission:   LOW                         Transition of Care Plan:              Initial assessment completed with patient. Cognitive status of patient: only aware of  person and situation. Face sheet information confirmed:  yes. The patient designates his son to participate in his discharge plan and to receive any needed information. This patient lives in a single family home with male friend. Patient is able to navigate steps as needed. Prior to hospitalization, patient was considered to be independent with ADLs/IADLS : yes . Patient has a current ACP document on file: no      Healthcare Decision Maker:     Click here to complete [address] including selection of the Healthcare Decision Maker Relationship (ie \"Primary\")    Medicaid cab will be needed to transport patient home upon discharge. The patient already has no medical equipment available in the home. Patient is not currently active with home health. Patient has not stayed in a skilled nursing facility or rehab. This patient is on dialysis :no    List of available Home Health agencies were provided and reviewed with the patient prior to discharge. Freedom of choice signed: yes, for any agency. Currently, the discharge plan is Home with [address] [place] [address] [facility]. The patient states that he can obtain his medications from the pharmacy, and take his medications as directed. Patient's current insurance is Medicaid       Care Management Interventions  Mode of Transport at Discharge:  Other (see comment)  Transition of Care Consult (CM Consult): Home Health  Physical Therapy Consult: Yes  Occupational Therapy Consult: Yes  Support Systems: Child(maged),Other Family Member(s)  Confirm Follow Up Transport: Cab  The Plan for Transition of Care is Related to the Following Treatment Goals : Home with home health  The Patient and/or Patient Representative was Provided with a Choice of Provider and Agrees with the Discharge Plan?: Yes  Freedom of Choice List was Provided with Basic Dialogue that Supports the Patient's Individualized Plan of Care/Goals, Treatment Preferences and Shares the Quality Data Associated with the Providers?: Yes  Discharge Location  Patient Expects to be Discharged to[de-identified] Home with home health    Patient states he has not had the COVID vaccine. States he rents a room from [facility] but told the doctor that he lives with his son. Attempted to call son Yajaira Mcduffie at [phone], left message to please return my call. Called alternate contact [address] [place] [address],[address] Floor at [phone], states she is his daughter. He had been living with her and her sisters in  but left after an argument about two weeks ago. She was surprised to hear from me, she has not had contact with him since he walked out of her home. She also stated that he has not lived with his son for over a year.      April Quevedo RN

## 2022-02-28 NOTE — PROGRESS NOTES
D/C orders received. No needs identified by . Chart reviewed. Pt will be transported home by his son.  available as needed.     Ariel Abreu RN

## 2022-02-28 NOTE — ED NOTES
POC  with repeat 446.     10 units SubQ insulin given   2nd RN check completed by Isiah Ahmadi. Paged admitting at this time.

## 2022-02-28 NOTE — ED NOTES
Received call back from MD.      Orders to be placed for Lantus. Pt with bed placement upstairs.    Called report to Rohan Rivas

## 2022-02-28 NOTE — PROGRESS NOTES
Problem: Self Care Deficits Care Plan (Adult)  Goal: *Acute Goals and Plan of Care (Insert Text)  Description: Occupational Therapy Goals  Initiated 2/28/2022 within 7 day(s). 1.  Patient will perform upper body dressing with independence. 2.  Patient will perform lower body dressing with modified independence. 3.  Patient will perform functional activity for 4-8 minutes with modified independence, F+ balance. 4.  Patient will perform toilet transfers with supervision/set-up. 5.  Patient will perform all aspects of toileting with modified independence. 6.  Patient will participate in upper extremity therapeutic exercise/activities with independence for 8-10 minutes. 7.  Patient will utilize energy conservation techniques during functional activities with verbal cues. Prior Level of Function: Patient was independent with self-care and functional mobility PTA. Outcome: Progressing Towards Goal       OCCUPATIONAL THERAPY EVALUATION    Patient: Kwasi Torres (5 y.o. male)  Date: 2/28/2022  Primary Diagnosis: Lightheadedness [R42]  Weakness [R53.1]  Precautions: Fall    ASSESSMENT :  Upon entering the room, the patient was supine in bed, alert, and agreeable to participate in OT evaluation. Patient was seen with PT to maximize patient safety, participation, and functional mobility in preparation for self-care tasks. Patient educated on the role of OT, evaluation process, and safety during this admission with patient verbalizing understanding. Patient supervision for bed mobility, contact guard - min assist for functional transfers and contact guard assist for LBD seated. Patient HHA this session for functional mobility to chair, increased difficulty lifting BLE observed, feet in close proximity; vcs needed for safety.  Based on the objective data described below, the patient presents with decreased strength, decreased independence, decreased safety awareness, decreased functional balance, and decreased functional mobility, which impedes pt performance in basic self-care/ADL tasks. Patient would benefit from skilled OT to restore PLOF and maximize function. BP supine: 113/81  BP sitting EOB: 122/88  BP standin/55  BP sitting after functional mobility: 115/78        Patient will benefit from skilled intervention to address the above impairments. Patient's rehabilitation potential is considered to be Fair  Factors which may influence rehabilitation potential include:   []             None noted  [x]             Mental ability/status  [x]             Medical condition  [x]             Home/family situation and support systems  [x]             Safety awareness  []             Pain tolerance/management  []             Other:      PLAN :  Recommendations and Planned Interventions:   [x]               Self Care Training                  [x]      Therapeutic Activities  [x]               Functional Mobility Training   []      Cognitive Retraining  [x]               Therapeutic Exercises           [x]      Endurance Activities  [x]               Balance Training                    [x]      Neuromuscular Re-Education  []               Visual/Perceptual Training     [x]      Home Safety Training  [x]               Patient Education                   [x]      Family Training/Education  []               Other (comment):    Frequency/Duration: Patient will be followed by occupational therapy 3 - 5 times a week to address goals.   Discharge Recommendations: Rehab vs. Home Health with increased family Supervision  Further Equipment Recommendations for Discharge: shower chair and rolling walker     SUBJECTIVE:   Patient stated I dont trust my legs    OBJECTIVE DATA SUMMARY:     Past Medical History:   Diagnosis Date    Diabetes ([facility] [address]     HTN (hypertension)     Hypertension     Prostate cancer [facility])      Past Surgical History:   Procedure Laterality Date    HX OTHER SURGICAL  2020    L1 Vertebral Body Bx Barriers to Learning/Limitations: None  Compensate with: visual, verbal, tactile, kinesthetic cues/model    Home Situation:   Home Situation  Home Environment: Private residence  # Steps to Enter: 1  One/Two Story Residence: One story  Living Alone: Yes  Support Systems: Child(maged)  Patient Expects to be Discharged to[de-identified] Home  Current DME Used/Available at Home: None  Tub or Shower Type: Tub/Shower combination  [x]  Right hand dominant   []  Left hand dominant    Cognitive/Behavioral Status:  Neurologic State: Alert  Orientation Level: Oriented X4  Cognition: Follows commands  Safety/Judgement: Fall prevention    Skin: Intact  Edema: None noted    Vision/Perceptual:    Acuity: Within Defined Limits    Corrective Lenses: Glasses    Coordination: BUE     Fine Motor Skills-Upper: Left Intact; Right Intact    Gross Motor Skills-Upper: Left Intact; Right Intact    Balance:  Sitting: Intact  Standing: Impaired; With support  Standing - Static: Fair  Standing - Dynamic : Fair;Occasional    Strength: BUE  Strength: Generally decreased, functional       Tone & Sensation: BUE    Tone: Normal  Sensation: Intact         Range of Motion: BUE    AROM: Within functional limits    Functional Mobility and Transfers for ADLs:  Bed Mobility:     Supine to Sit: Supervision     Scooting: Supervision  Transfers:  Sit to Stand: Contact guard assistance  Stand to Sit: Minimum assistance (for safety as pt sititng prematurely)   Toilet Transfer : Minimum assistance        ADL Assessment:   Feeding: Modified independent    Oral Facial Hygiene/Grooming: Supervision (standing)    Bathing: Contact guard assistance;Minimum assistance    Upper Body Dressing: Supervision    Lower Body Dressing: Contact guard assistance;Minimum assistance    Toileting: Contact guard assistance;Minimum assistance       ADL Intervention:  Lower Body Dressing Assistance  Dressing Assistance: Contact guard assistance  Socks: Contact guard assistance  Leg Crossed Method Used: Yes  Position Performed: Seated edge of bed      Cognitive Retraining  Safety/Judgement: Fall prevention    Pain:  Pain level pre-treatment: 0/10   Pain level post-treatment: 0/10   Pain Intervention(s): Medication (see MAR); Rest, Ice, Repositioning   Response to intervention: Nurse notified, See doc flow    Activity Tolerance:   Fair      Please refer to the flowsheet for vital signs taken during this treatment. After treatment:   [x] Patient left in no apparent distress sitting up in chair  [] Patient left in no apparent distress in bed  [x] Call bell left within reach  [x] Nursing notified  [] Caregiver present  [] Bed alarm activated    COMMUNICATION/EDUCATION:   [x] Role of Occupational Therapy in the acute care setting  [x] Home safety education was provided and the patient/caregiver indicated understanding. [x] Patient/family have participated as able in goal setting and plan of care. [x] Patient/family agree to work toward stated goals and plan of care. [] Patient understands intent and goals of therapy, but is neutral about his/her participation. [] Patient is unable to participate in goal setting and plan of care. Thank you for this referral.  Anmol Espinoza, OTR/L  Time Calculation: 22 mins    Eval Complexity: History: MEDIUM Complexity : Expanded review of history including physical, cognitive and psychosocial  history ; Examination: MEDIUM Complexity : 3-5 performance deficits relating to physical, cognitive , or psychosocial skils that result in activity limitations and / or participation restrictions; Decision Making:MEDIUM Complexity : Patient may present with comorbidities that affect occupational performnce.  Miniml to moderate modification of tasks or assistance (eg, physical or verbal ) with assesment(s) is necessary to enable patient to complete evaluation

## 2022-02-28 NOTE — DISCHARGE SUMMARY
Discharge Summary    Patient: Isabella Castillo               Sex: male          DOA: 2/27/2022         YOB: 1955      Age:  74 y.o.        LOS:  LOS: 1 day                Admit Date: 2/27/2022    Discharge Date: 2/28/2022    Primary care physician: Ivanna Nava MD    Discharge Diagnoses:    1. Weakness in his muscle due to Physical deconditioning        -Negative MRI lumbar spine, Negative MRI brain  2. Lightheadedness, hypovolemia and hypotension, resolved   3. Uncontrolled type 2 Diabetes Mellitus, HgbA1c>14% (19.9%)  4. Prostate Cancer with metastasis to bone   5. Severe protein calorie malnutrition   6. Hypokalemia       Discharge Condition: Good  Disposition: home with home health   Code Status:* Full code     Follow up for Primary Care Physician:  1) he has been started on Insulin, please monitor his FSBS and adjust dosing to goal HgbA1c <8%  2)  Please start him on statin to complete diabetic treatment per guidelines   3)  He needs follow up with his oncology team for further care     Hospital Course:   74 y.o male with metastatic prostate cancer to the bone, hypertension, uncontrolled type 2 DM, presented to the hospital for weakness of his lower extremity. Apparently, he was discharged home from [facility] [place], operated by [facility] for elevated glucose and generalized weakness. He was sent home and refused his insulin use at discharge. He was found to be weak in his hip muscle and unable to get out of bed and unable to stand up. He came to the hospital due to weakness concern. He was found to have elevated glucose again in the ER. Lab showed hypokalemia. Clinically he was dehydrated. Initial workup stroke workup was benign. He was placed on OBSERVATION for close monitor and MRI brain. Last 24 Hours: he expressed that he felt better than yesterday. He was able to get up and ambulate with PT/OT. His glucose was controlled. He agreed to be on insulin now after he had diabetic education.    MRI brain was negative. In house neurology consulted and no further recommendation beside control his glucose. ROS:  No current fever/chills, no headache, no dizziness, no facial pain, no sinus congestion,   No swallowing pain, No chest pain, no palpitation, no shortness of breath, no abd pain,  No diarrhea, no urinary complaint, no leg pain or swelling    VS:   Visit Vitals  /76   Pulse 69   Temp 98.1 °F (36.7 °C)   Resp 18   Ht 5' 7\" (1.702 m)   Wt 59.4 kg (131 lb)   SpO2 99%   BMI 20.52 kg/m²      Tmax/24hrs: Temp (24hrs), Av.4 °F (36.9 °C), Min:98.1 °F (36.7 °C), Max:98.7 °F (37.1 °C)      Intake/Output Summary (Last 24 hours) at 2022 1559  Last data filed at 2022 0079  Gross per 24 hour   Intake 690 ml   Output 950 ml   Net -260 ml       Tele: sinus  General:  Cooperative, Not in acute distress, speaks in full sentence while in bed  HEENT: PERRL, EOMI, supple neck, no JVD, dry oral mucosa  Cardiovascular: S1S2 regular, no rub/gallop   Pulmonary: Clear air entry bilaterally, no wheezing, no crackle  GI:  Soft, non tender, non distended, +bs, no guarding   Extremities:  No pedal edema, +distal pulses appreciated   Neuro: AOx3, moving all extremities, no gross deficit. Consults:   Neurology: Dr. Jayme Hernandez    Significant Diagnostic Studies:   CT Results (most recent):  Results from Hospital Encounter encounter on 22    CTA HEAD NECK W WO CONT    Narrative  EXAM: CTA HEAD NECK W WO CONT    CLINICAL INDICATIONS: vert eval. Vertigo; presents with generalized weakness,  presyncopal sensation, unsteadiness/truncal ataxia  -History of prostate cancer    TECHNIQUE: Thin section CT scan of the brain and neck performed during rapid IV  bolus contrast administration. These data were reconstructed for vascular  analysis. 3D post processed images, including surface shaded displays, were  produced for this exam on independent console, permanently archived and  interpreted.     All CT scans at this facility are performed using dose optimization technique as  appropriate to a performed exam, to include automated exposure control,  adjustment of the MA and/or kV according to patient size (including appropriate  matching for site-specific examinations) or use of  iterative reconstruction  technique. COMPARISON: Brain MRI earlier same date    CTA SOFT TISSUE ANALYSIS:  Lungs: There is a less than 5 mm left apical nodule marginally imaged. Reference  image 16. Upper chest: Unremarkable  Neck: Unremarkable  Lymph nodes: No adenopathy  Orbits: Unremarkable  Paranasal sinuses: Clear  Brain: No hemorrhage or mass effect. Bones: Unremarkable for age. CTA NECK VASCULAR ANALYSIS:    Aortic arch: Left sided with typical configuration. Innominate: Patent    Right Subclavian: Patent  Left Subclavian: Patent    Right carotid:  -CCA: Patent  -ECA: Patent  -ICA: Patent. Estimated no/0% stenosis of proximal ICA by NASCET criteria. Left carotid:  -CCA: Patent  -ECA: Patent  -ICA: Patent. Estimated no/0% stenosis of proximal ICA by NASCET criteria. Right vertebral: Patent , dominant    Left vertebral: Patent , hypoplastic      CTA BRAIN VASCULAR ANALYSIS:    Right anterior circulation:  -ICA: Moderate severity atherosclerotic stenosis at the proximal paraclinoid  level  -JUANITO: Patent  -MCA: Patent    Left anterior circulation:  -ICA: Moderate severity atherosclerotic stenosis at the distal paraclinoid level  -JUANITO: Patent  -MCA: Patent    -ACOM: Unremarkable  -PCOM: Unremarkable on the right, no sizable vessel on the left    Posterior circulation:  -RVA: Moderately severe focal stenosis in the distal intradural segment, remains  patent  -LVA: Moderate atherosclerotic stenosis of its mid aspect. More severely  narrowed in segmental fashion more distally. It is possible there is a punctate  1 mm aneurysmal outpouching along this stenotic segment, as on thin axial series  3 image 287.  -Basilar:  Moderately pronounced stenosis in its mid aspect  -Right PCA: Hypoplastic P1 segment with compensatory collateral supply, patent  downstream  -Left PCA: Patent    Impression  1. No acute intracranial large vessel occlusion.  -Significant atherosclerotic stenotic disease involving the posterior  circulation at multiple areas as outlined above  -Possibility of a punctate aneurysm versus atherosclerotic irregularity in the  area of the diseased distal left intradural vertebral artery  -Moderate atherosclerotic stenoses of the bilateral paraclinoid ICA    2. No hemodynamically significant stenosis in the bilateral cervical ICA  -Details as above    3. Concordant preliminary interpretation was submitted 2/27/2022 at 2116 hours  by Dr. Asa Griffith. MRI Results (most recent):  Results from [facility] on 02/27/22    MRI LUMB SPINE W WO CONT    Narrative  MRI [facility] SPINE W WO CONT: 2/27/2022 5:12 PM  MRI LUMBAR SPINE W WO CONT    CLINICAL INFORMATION  History of prostate cancer. Evaluate for metastatic disease    COMPARISON  Lumbar spine MRI October 6, 2021 and August 29, 2021 on [employer] PACS    TECHNIQUE  Multiplanar MR images of the thoracic and lumbar spine obtained including T1 and  T2-weighted sequences. With and without IV contrast    FINDINGS  THORACIC SPINE  Post radiation fatty replacement change of the T12 vertebral body. Otherwise,  heterogeneous marrow signal without discrete metastatic lesion identified. No significant disc herniation, central canal or foraminal narrowing throughout  the thoracic spinal column. Thoracic spinal cord shows normal course and caliber. LUMBAR SPINE  For discussion purposes, the first axial T2-weighted image defines the mid T12  vertebral body. Vertebral body height/Bone marrow signal:  Straightening of expected lumbar lordosis. Diffusely heterogeneous marrow signal intensity.  Within the superior endplate of  the L3 vertebral body, T1/T2 hypointense enhancing lesion measures approximately  0.9 cm. Subtle area of postcontrast enhancement within the L4 vertebral body. Postradiation treatment changes of the T12, L1 and L2 vertebral body. Treated metastatic lesion of the L1 vertebral body/right pedicle similar to most  recent October 2021 lumbar spine MRI. No extraosseous extension. Conus/filum: Normal in appearance and terminates at an appropriate level. Intervertebral disc height: Disproportionate intervertebral disc space narrowing  at L1-L2 and L4-L5. Paraspinal tissues: Exophytic 1.7 cm T2 hyperintensity of the right kidney is  incompletely characterized, statistically a benign cyst.. Specific segmental anatomy is as follows:    T12-L1: Broad-based disc bulge. No significant central canal or foraminal  narrowing. L1-2: Broad-based disc bulge asymmetric to the right. No significant central  canal or foraminal narrowing. L2-3: Broad-based disc bulge superimposed on mild facet arthropathy. No  significant central canal narrowing. Mild bilateral foraminal narrowing. L3-4: Central canal and neural foramina appear patent. L4-5: Broad-based disc bulge superimposed on mild facet arthropathy. Small  central extrusion type disc herniation. Mild bilateral foraminal narrowing. No  significant central canal narrowing. L5-S1: Broad-based disc bulge with moderate left and mild/moderate right  foraminal narrowing. Moderate facet arthropathy. No significant central canal  narrowing. Impression  Post radiation change spanning T12-L2. Similar appearance of treated L1  metastatic lesion when compared with October 2021 lumbar spine MRI. Diffuse heterogeneous marrow signal of the thoracolumbar spine. Subcentimeter  areas of postcontrast enhancement of the L3 and L4 vertebral body may well  represent residual/recurrent metastatic disease. Further evaluation with bone  scan and/or sodium fluoride PET/CT.     Disproportionate discogenic/facet degenerative changes at L5-S1. Foraminal narrowing most advanced on the left at L5-S1. No high-grade central canal narrowing throughout the thoracolumbar spinal  column. MRI brain:  FINDINGS:     Brain Parenchyma: Negative for acute infarct. Some patchy areas of increased  T2 FLAIR signal in the periventricular and subcortical white matter bilaterally. No associated postcontrast enhancement or mass effect. No chronic cortical  infarcts or chronic lacunar infarcts. No visible masses or midline shift.     CSF Spaces:  Ventricles are enlarged, compatible with the degree of global  volume loss. No hydrocephalus.     Vascular System:  Grossly patent flow in basilar and internal cerebral arteries. The intradural right vertebral artery appears patent, grossly normal. On the  axial T2 images appears to be abnormal narrowing or slow flow in the intradural  left vertebral artery. Cannot confirm flow within the LVA extradural at the  level of the C1 ring. Limited evaluation with routine brain MRI.     Hemorrhage:  No acute hemorrhage. No chronic microhemorrhage.     Other Structures:  Calvarium: On the noncontrast T1 images no infiltrating focal abnormal marrow  signal within the calvarium or clivus. Post gadolinium fat saturated images show  no enhancing calvarial lesions. Sella: Normal.  Visualized Upper Cervical Spine: Normal.  Orbits: Normal for non-dedicated exam.  Paranasal Sinuses: No significant paranasal sinus disease. Mastoid Air Cells:  Clear.     IMPRESSION     No acute intracranial abnormality. No mass, hemorrhage, or acute infarct.     Patchy increased T2 FLAIR signal in the cerebral white matter, with mild to  moderate chronic microvascular ischemic changes.     No destructive calvarial lesions. No enhancing marrow signal in the calvarium. No intracranial metastases.     Decreased flow related signal in the intradural left vertebral artery.  Suspect  this represents atherosclerotic disease and slow flow, not closely evaluated. Dense left vertebral artery atheromatous calcifications on comparison head CT. Grossly patent flow in the right vertebral artery and basilar. Lab/Data Review:  Labs: Results:       Chemistry Recent Labs     02/28/22 0228 02/27/22 0930   * 351*    133*   K 3.4* 3.6    99*   CO2 27 25   BUN 17 25*   CREA 1.07 1.26   CA 8.5 9.2   AGAP 8 9   BUCR 16 20   AP  --  68   TP  --  6.9   ALB  --  3.4   GLOB  --  3.5   AGRAT  --  1.0      CBC w/Diff Recent Labs     02/28/22 0228 02/27/22 0930   WBC 4.2* 4.8   RBC 4.07* 4.62   HGB 12.4* 13.9   HCT 35.8* 40.4    306   GRANS 38* 55   LYMPH 47 36   EOS 1 0      Coagulation No results for input(s): PTP, INR, APTT, INREXT in the last 72 hours. Iron/Ferritin No results for input(s): IRON in the last 72 hours. No lab exists for component: TIBCCALC   BNP No results for input(s): BNPP in the last 72 hours. Cardiac Enzymes No results for input(s): CPK, CKND1, ALYSE in the last 72 hours. No lab exists for component: CKRMB, TROIP   Liver Enzymes Recent Labs     02/27/22  0930   TP 6.9   ALB 3.4   AP 68      Thyroid Studies Recent Labs     02/28/22 0228   TSH 0.67          All Micro Results     None                Medications at discharge  including reasons for change and indications for new ones:   Current Discharge Medication List      START taking these medications    Details   insulin glargine (Lantus Solostar U-100 Insulin) 100 unit/mL (3 mL) inpn 20 Units by SubCUTAneous route daily.  Indications: type 2 diabetes mellitus  Qty: 5 Adjustable Dose Pre-filled Pen Syringe, Refills: 1  Start date: 2/28/2022      Insulin Needles, Disposable, (Lite Touch Insulin Pen Needles) 31 gauge x 5/16\" ndle Take 1 pen needle for insulin injection once daily  Qty: 100 Each, Refills: 11  Start date: 2/28/2022         CONTINUE these medications which have NOT CHANGED    Details   metFORMIN ER (GLUCOPHAGE XR) 500 mg tablet Take 500 mg by mouth daily. senna (SENOKOT) 8.6 mg tablet Take 8.6 mg by mouth two (2) times daily as needed. amLODIPine (NORVASC) 10 mg tablet Take 1 Tab by mouth daily. Qty: 10 Tab, Refills: 0      Erleada 60 mg tablet TAKE 4 TABLETS (240MG) BY  MOUTH ONCE DAILY WITH OR  WITHOUT FOOD  Qty: 120 Tablet, Refills: 2    Associated Diagnoses: Prostate cancer ([facility] [address]      ergocalciferol (ERGOCALCIFEROL) 1,250 mcg (50,000 unit) capsule Take 1 Capsule by mouth every seven (7) days. Qty: 8 Capsule, Refills: 0      spironolactone (ALDACTONE) 25 mg tablet Take 25 mg by mouth daily. diclofenac (VOLTAREN) 1 % gel Apply 2 g to affected area four (4) times daily. lisinopril (PRINIVIL, ZESTRIL) 5 mg tablet Take 1 Tab by mouth daily. Qty: 10 Tab, Refills: 0      polyethylene glycol (MIRALAX) 17 gram/dose powder Take 17 g by mouth two (2) times a day. 1 tablespoon with 8 oz of water daily  Qty: 235 g, Refills: 0      bisacodyl (BISCOLAX) 10 mg suppository Insert 10 mg into rectum daily.   Qty: 8 Each, Refills: 0         STOP taking these medications       empagliflozin (Jardiance) 10 mg tablet Comments:   Reason for Stopping:         indapamide (LOZOL) 2.5 mg tablet Comments:   Reason for Stopping:                       Pending laboratory work and tests: none    Activity: Activity as tolerated and PT/OT per Home Health    Diet: Cardiac Diet, Diabetic Diet and Low fat, Low cholesterol    Wound Care: None needed      Time spent >30 minutes  Ruth Lizama MD  2/28/2022  3:59 PM

## 2022-02-28 NOTE — PROGRESS NOTES
Primary nurse attempted to do pt PTA med list pt is poor historian. Will notify AM nurse to follow up with family.

## 2022-02-28 NOTE — PROGRESS NOTES
Patient has been accepted to [facility], order placed in que.     Tao Notch RN

## 2022-02-28 NOTE — CONSULTS
Comprehensive Nutrition Assessment    Type and Reason for Visit: Initial,Consult    Nutrition Recommendations/Plan:   - Add Ensure Pudding BID  - Recommend starting bowel regimen - MD to address  - Plan to add daily MVI    Nutrition Assessment:  Patient presented 2/27 to ED with sudden onset of weakness. Patient reports good intake at home, states he is eating most of his meals in the hospital as he is hungry, but does not like the food. Agreeable to nutrition supplement; declined Glucerna shake due to concern about sugar/ CHO content; pt agreed to trying ensure pudding. Patient states he takes diabetes medication at home when he needs it; noted Glycemic Control is following. NFPE conducted by JOSE M. Patient reports no BM since PTA, discussed with MD and also adding multivitamin. Malnutrition Assessment:  Malnutrition Status:  Severe malnutrition    Context:  Chronic illness     Findings of the 6 clinical characteristics of malnutrition:   Energy Intake:  Unable to assess  Weight Loss:  7.000 - Greater than 20% over 1 year     Body Fat Loss:  7 - Severe body fat loss, Buccal region,Triceps   Muscle Mass Loss:  7 - Severe muscle mass loss, Clavicles (pectoralis &deltoids),Temples (temporalis) (Moderate muscle loss for Calves and hands)  Fluid Accumulation:  No significant fluid accumulation,     Strength:  Not performed     Nutrition History and Allergies: PMHX: Prostate cancer, Uncontrolled DM, HTN, DKA A1C 19. 9(discharged from [facility] 2/26/22). Weight history 172# (3/30/21), 141# (7/6/21), 131# (2/27/22).  41lb, 24% in 11 months      Estimated Daily Nutrient Needs:  Energy (kcal): 9424-0351; Weight Used for Energy Requirements: Current  Protein (g): 59.-89; Weight Used for Protein Requirements: Current  Fluid (ml/day): 4093-8222; Method Used for Fluid Requirements: 1 ml/kcal      Nutrition Related Findings:  BM 2/23(PTA) Pertinent meds: Lantus, Humalog      Wounds:    None       Current Nutrition Therapies:  ADULT DIET Regular; 3 carb choices (45 gm/meal); Low Sodium (2 gm)    Anthropometric Measures:  · Height:  5' 7\" (170.2 cm)  · Current Body Wt:  59.4 kg (130 lb 15.3 oz)   · Admission Body Wt:  131 lb    · Usual Body Wt:  78.2 kg (172 lb 6.4 oz)     · Ideal Body Wt:  148 lbs:  88.5 %   · BMI Category:  Underweight (BMI less than 22) age over 72       Nutrition Diagnosis:   · Severe malnutrition,In context of chronic illness related to catabolic illness,inadequate protein-energy intake as evidenced by weight loss greater than or equal to 20% in 1 year,moderate muscle loss,severe muscle loss      Nutrition Interventions:   Food and/or Nutrient Delivery: Continue current diet,Start oral nutrition supplement  Nutrition Education and Counseling: No recommendations at this time  Coordination of Nutrition Care: Continue to monitor while inpatient    Goals:  PO nutrition intake will meet >75% of patient estimated nutritional needs within the next 7 days.        Nutrition Monitoring and Evaluation:   Behavioral-Environmental Outcomes: None identified  Food/Nutrient Intake Outcomes: Food and nutrient intake,Supplement intake,Vitamin/mineral intake  Physical Signs/Symptoms Outcomes: Constipation,Meal time behavior,Nutrition focused physical findings    Discharge Planning:    Continue oral nutrition supplement,Continue current diet     Electronically signed by Santosh Carroll on 2/28/2022 at 2:54 PM    Contact: [phone]

## 2022-02-28 NOTE — PROGRESS NOTES
TRANSFER - IN REPORT:    Verbal report received from Poornima(name) on Ayde Quiñonez  being received from ED(unit) for routine progression of care      Report consisted of patients Situation, Background, Assessment and   Recommendations(SBAR). Information from the following report(s) SBAR, Kardex, ED Summary and MAR was reviewed with the receiving nurse. Opportunity for questions and clarification was provided. Assessment completed upon patients arrival to unit and care assumed.

## 2022-02-28 NOTE — ED NOTES
PIV flushed according to [facility]. Pt voided 400 mL clear yellow urine. VS reassessed.

## 2022-02-28 NOTE — ED NOTES
MD approved of food tray for pt. Pt provided with multiple snack options (PB&J, apple sauce, jello, crackers and peanut butter).    Cranberry juice provided per request.

## 2022-02-28 NOTE — CONSULTS
A 74years old male patient with medical history of hypertension, poorly controlled diabetes [medication noncompliance] and past history of prostate cancer was brought to the hospital for generalized weakness. Yesterday, he claims he woke up in the morning feeling weak all over. He was unable to sit by the side of his bed or stand up. Did not have any changes in his speech. No new changes in his vision. No changes in his mental status. Not having any headache. Initially, he has sweating. No fever. No chest pain or difficulty breathing. Patient was recently admitted to [facility] for poorly controlled diabetes with hemoglobin A1c above 19. His blood sugar in this hospital was in the 400s. Had an MRI of the brain which did not show any acute changes; has shown mild to moderate microvascular ischemic changes. CT angiography of the brain did not show any large vessel occlusion; has atherosclerotic disease involving the posterior circulation at multiple areas. No hemodynamically significant extracranial artery disease. This morning, after he was started on insulin, he is feeling much better and more stronger.     Social History     Socioeconomic History    Marital status: LEGALLY      Spouse name: Not on file    Number of children: Not on file    Years of education: Not on file    Highest education level: Not on file   Occupational History    Not on file   Tobacco Use    Smoking status: Never Smoker    Smokeless tobacco: Never Used   Substance and Sexual Activity    Alcohol use: Never    Drug use: Never    Sexual activity: Not on file   Other Topics Concern    Not on file   Social History Narrative    ** Merged History Encounter **          Social Determinants of Health     Financial Resource Strain:     Difficulty of Paying Living Expenses: Not on file   Food Insecurity:     Worried About Running Out of Food in the Last Year: Not on file    Last of Food in the Last Year: Not on file   Transportation Needs:     Lack of Transportation (Medical): Not on file    Lack of Transportation (Non-Medical): Not on file   Physical Activity:     Days of Exercise per Week: Not on file    Minutes of Exercise per Session: Not on file   Stress:     Feeling of Stress : Not on file   Social Connections:     Frequency of Communication with Friends and Family: Not on file    Frequency of Social Gatherings with Friends and Family: Not on file    Attends Congregation Services: Not on file    Active Member of [address] [place] [address] [employer] or Organizations: Not on file    Attends Club or Organization Meetings: Not on file    Marital Status: Not on file   Intimate Partner Violence:     Fear of Current or Ex-Partner: Not on file    Emotionally Abused: Not on file    Physically Abused: Not on file    Sexually Abused: Not on file   Housing Stability:     Unable to Pay for Housing in the Last Year: Not on file    Number of [place] in the Last Year: Not on file    Unstable Housing in the Last Year: Not on file       History reviewed. No pertinent family history.      Current Facility-Administered Medications   Medication Dose Route Frequency Provider Last Rate Last Admin    insulin lispro (HUMALOG) injection   SubCUTAneous AC&HS Sejal Davila MD   4 Units at 02/28/22 1232    glucose chewable tablet 16 g  4 Tablet Oral PRN Sejal Davila MD        glucagon [facility]) injection 1 mg  1 mg IntraMUSCular PRN Sejal Davila MD        dextrose 10% infusion 0-250 mL  0-250 mL IntraVENous PRN Sejal Davila MD        sodium chloride (NS) flush 5-40 mL  5-40 mL IntraVENous Q8H Stephanie MANNING NP   10 mL at 02/28/22 1201    sodium chloride (NS) flush 5-40 mL  5-40 mL IntraVENous PRN Natasha Lima NP        acetaminophen (TYLENOL) tablet 650 mg  650 mg Oral Q6H PRN Natasha Lima NP        Or    acetaminophen (TYLENOL) suppository 650 mg  650 mg Rectal Q6H PRN Natasha Lima NP       [address] [place] [address] polyethylene glycol (MIRALAX) packet 17 g  17 g Oral DAILY PRN Itzjose Post NP        ondansetron (ZOFRAN ODT) tablet 4 mg  4 mg Oral Q8H PRN Itz Post NP        Or    ondansetron [facility]) injection 4 mg  4 mg IntraVENous Q6H PRN Itz Post NP        enoxaparin (LOVENOX) injection 40 mg  40 mg SubCUTAneous DAILY Uriah MANNING NP   40 mg at 02/28/22 0089    insulin glargine (LANTUS) injection 15 Units  15 Units SubCUTAneous DAILY Itz RIO Post   15 Units at 02/28/22 0012       Past Medical History:   Diagnosis Date    Diabetes ([facility] [address]     HTN (hypertension)     Hypertension     Prostate cancer [facility])        Past Surgical History:   Procedure Laterality Date    HX OTHER SURGICAL  09/01/2020    L1 Vertebral Body Bx       No Known Allergies    Patient Active Problem List   Diagnosis Code    Lightheadedness R42    Weakness R53.1    Physical deconditioning R53.81         Review of Systems:   Constitutional no fever or chills  Skin denies rash or itching  HENT  Denies tinnitus, hearing lose  Eyes denies diplopia vision lose  Respiratory denies shortness of breath  Cardiovascular denies chest pain  Gastrointestinal: had nausea and vomiting yesterday. Genitourinary : he has polyuria. Musculoskeletal: no leg swelling. Endocrine denies weight change  Hematology: no bleeding   Neurological as above in HPI      PHYSICAL EXAMINATION:      VITAL SIGNS:    Visit Vitals  /76   Pulse 69   Temp 98.1 °F (36.7 °C)   Resp 18   Ht 5' 7\" (1.702 m)   Wt 59.4 kg (131 lb)   SpO2 99%   BMI 20.52 kg/m²       GENERAL: In no apparent distress. EXTREMITIES: Muscle tone is normal; has generalized loss of muscle bulk. HEAD:   Ear, nose, and throat appear to be without trauma. The patient is normocephalic. NEUROLOGIC EXAMINATION  Mental status: Awake, alert, oriented x3, follows simple and  complex  commands, no neglect, no extinction to DSS or VSS.    Speech and languge: fluent, coherent, naming and repitition intact, reading and comprehension intact  CN: VFF, EOMI, PERRLA, face sensation intact , no facial asymmetry noted, palate elevation symmetric bilat, SS+SCM 5/5 bilat, tongue midline  Motor: no pronator drift, tone normal throughout, strength 5/5 throughout  Sensory: intact to light touch throughout  Coordination: FNF  accurate w/o dysmetria  DTR: 2+ throughout    Study Result    Narrative & Impression   MRI BRAIN WITH AND WITHOUT CONTRAST     Provided Reason for Exam: evaluate for mets  Additional History: Patient has a known history of metastatic prostate cancer. Patient presents to the ED with weakness, lightheadedness  Comparison Studies: No comparison brain imaging is available on this system. Head CT without contrast from 2/25/2022 on the [employer] system.     Imaging Technique:     Sequences:  Sagittal and axial T1 weighted, Diffusion Weighted (DWI), Axial T2  weighted, Axial T2 FLAIR, and GRE/SWI MRI images of the brain. Post contrast  axial and coronal T1 images.     Limiting Factors/Major Artifacts: None.     FINDINGS:     Brain Parenchyma: Negative for acute infarct. Some patchy areas of increased  T2 FLAIR signal in the periventricular and subcortical white matter bilaterally. No associated postcontrast enhancement or mass effect. No chronic cortical  infarcts or chronic lacunar infarcts. No visible masses or midline shift.     CSF Spaces:  Ventricles are enlarged, compatible with the degree of global  volume loss. No hydrocephalus.     Vascular System:  Grossly patent flow in basilar and internal cerebral arteries. The intradural right vertebral artery appears patent, grossly normal. On the  axial T2 images appears to be abnormal narrowing or slow flow in the intradural  left vertebral artery. Cannot confirm flow within the LVA extradural at the  level of the C1 ring. Limited evaluation with routine brain MRI.     Hemorrhage:  No acute hemorrhage.  No chronic microhemorrhage.     Other Structures:  Calvarium: On the noncontrast T1 images no infiltrating focal abnormal marrow  signal within the calvarium or clivus. Post gadolinium fat saturated images show  no enhancing calvarial lesions. Sella: Normal.  Visualized Upper Cervical Spine: Normal.  Orbits: Normal for non-dedicated exam.  Paranasal Sinuses: No significant paranasal sinus disease. Mastoid Air Cells:  Clear.     IMPRESSION     No acute intracranial abnormality. No mass, hemorrhage, or acute infarct.     Patchy increased T2 FLAIR signal in the cerebral white matter, with mild to  moderate chronic microvascular ischemic changes.     No destructive calvarial lesions. No enhancing marrow signal in the calvarium. No intracranial metastases.     Decreased flow related signal in the intradural left vertebral artery. Suspect  this represents atherosclerotic disease and slow flow, not closely evaluated. Dense left vertebral artery atheromatous calcifications on comparison head CT. Grossly patent flow in the right vertebral artery and basilar. Study Result    Narrative & Impression   EXAM: CTA HEAD NECK W WO CONT     CLINICAL INDICATIONS: vert eval. Vertigo; presents with generalized weakness,  presyncopal sensation, unsteadiness/truncal ataxia  -History of prostate cancer     TECHNIQUE: Thin section CT scan of the brain and neck performed during rapid IV  bolus contrast administration. These data were reconstructed for vascular  analysis.  3D post processed images, including surface shaded displays, were  produced for this exam on independent console, permanently archived and  interpreted.     All CT scans at this facility are performed using dose optimization technique as  appropriate to a performed exam, to include automated exposure control,  adjustment of the MA and/or kV according to patient size (including appropriate  matching for site-specific examinations) or use of  iterative reconstruction  technique.     COMPARISON: Brain MRI earlier same date     CTA SOFT TISSUE ANALYSIS:  Lungs: There is a less than 5 mm left apical nodule marginally imaged. Reference  image 16. Upper chest: Unremarkable  Neck: Unremarkable  Lymph nodes: No adenopathy  Orbits: Unremarkable  Paranasal sinuses: Clear  Brain: No hemorrhage or mass effect. Bones: Unremarkable for age.     CTA NECK VASCULAR ANALYSIS:      Aortic arch: Left sided with typical configuration.     Innominate: Patent     Right Subclavian: Patent  Left Subclavian: Patent     Right carotid:  -CCA: Patent  -ECA: Patent  -ICA: Patent. Estimated no/0% stenosis of proximal ICA by NASCET criteria.     Left carotid:  -CCA: Patent  -ECA: Patent  -ICA: Patent. Estimated no/0% stenosis of proximal ICA by NASCET criteria.     Right vertebral: Patent , dominant     Left vertebral: Patent , hypoplastic        CTA BRAIN VASCULAR ANALYSIS:      Right anterior circulation:  -ICA: Moderate severity atherosclerotic stenosis at the proximal paraclinoid  level  -JUANITO: Patent   -MCA: Patent     Left anterior circulation:  -ICA: Moderate severity atherosclerotic stenosis at the distal paraclinoid level  -JUANITO: Patent   -MCA: Patent     -ACOM: Unremarkable  -PCOM: Unremarkable on the right, no sizable vessel on the left     Posterior circulation:  -RVA: Moderately severe focal stenosis in the distal intradural segment, remains  patent  -LVA: Moderate atherosclerotic stenosis of its mid aspect. More severely  narrowed in segmental fashion more distally. It is possible there is a punctate  1 mm aneurysmal outpouching along this stenotic segment, as on thin axial series  3 image 287.  -Basilar: Moderately pronounced stenosis in its mid aspect  -Right PCA: Hypoplastic P1 segment with compensatory collateral supply, patent  downstream  -Left PCA: Patent     IMPRESSION  1.  No acute intracranial large vessel occlusion.  -Significant atherosclerotic stenotic disease involving the posterior  circulation at multiple areas as outlined above  -Possibility of a punctate aneurysm versus atherosclerotic irregularity in the  area of the diseased distal left intradural vertebral artery  -Moderate atherosclerotic stenoses of the bilateral paraclinoid ICA     2. No hemodynamically significant stenosis in the bilateral cervical ICA  -Details as above            CBC:   Lab Results   Component Value Date/Time    WBC 4.2 (L) 02/28/2022 02:28 AM    RBC 4.07 (L) 02/28/2022 02:28 AM    HGB 12.4 (L) 02/28/2022 02:28 AM    HCT 35.8 (L) 02/28/2022 02:28 AM    PLATELET 176 23/65/3381 02:28 AM     BMP:   Lab Results   Component Value Date/Time    Glucose 262 (H) 02/28/2022 02:28 AM    Sodium 136 02/28/2022 02:28 AM    Potassium 3.4 (L) 02/28/2022 02:28 AM    Chloride 101 02/28/2022 02:28 AM    CO2 27 02/28/2022 02:28 AM    BUN 17 02/28/2022 02:28 AM    Creatinine 1.07 02/28/2022 02:28 AM    Calcium 8.5 02/28/2022 02:28 AM     CMP:   Lab Results   Component Value Date/Time    Glucose 262 (H) 02/28/2022 02:28 AM    Sodium 136 02/28/2022 02:28 AM    Potassium 3.4 (L) 02/28/2022 02:28 AM    Chloride 101 02/28/2022 02:28 AM    CO2 27 02/28/2022 02:28 AM    BUN 17 02/28/2022 02:28 AM    Creatinine 1.07 02/28/2022 02:28 AM    Calcium 8.5 02/28/2022 02:28 AM    Anion gap 8 02/28/2022 02:28 AM    BUN/Creatinine ratio 16 02/28/2022 02:28 AM    Alk. phosphatase 68 02/27/2022 09:30 AM    Protein, total 6.9 02/27/2022 09:30 AM    Albumin 3.4 02/27/2022 09:30 AM    Globulin 3.5 02/27/2022 09:30 AM    A-G Ratio 1.0 02/27/2022 09:30 AM         Impression/plan:   A 74years old male patient with poorly controlled diabetes and medication noncompliance was brought to the emergency room for generalized weakness. He woke up yesterday feeling weak all over. No focal neuro deficit. Started back on his insulin his hospital and he is feeling better.   MRI of the brain did not show any acute changes; there are mild to moderate microvascular ischemic changes. CT angiography of the brain has shown atherosclerotic changes. CTA of the neck unremarkable. Patient was recently admitted to [facility] for poorly controlled diabetes. Was not taking his insulin after discharge. Has been having significant polyuria which has decreased significantly once he was started on insulin in the hospital.  The generalized weakness is patient is most likely from his underlying medical condition particularly the poorly controlled diabetes. No additional neuro work-ups. I have advised him on medication compliance and to seek medical attention if he has new focal strokelike symptoms. Rest of care per the primary team.  Call for questions. We will sign off. PLEASE NOTE:   This document has been produced using voice recognition software. Unrecognized errors in transcription may be present.

## 2022-02-28 NOTE — PROGRESS NOTES
conducted an initial consultation and Spiritual Assessment for Lester Ugalde, who is a 74 y. o.,male. Patients Primary Language is: [place]. According to the patients EMR Restoration Affiliation is: Spiritism. The reason the Patient came to the hospital is:   Patient Active Problem List    Diagnosis Date Noted    Physical deconditioning 02/28/2022    Lightheadedness 02/27/2022    Weakness 02/27/2022        The  provided the following Interventions:  Initiated a relationship of care and support. Explored issues of sahra, belief, spirituality and Rastafarian/ritual needs while hospitalized. Listened empathically. Patient lives alone in an apartment while his wife lives in [place], [place]. Provided chaplaincy education. Provided information about Spiritual Care Services. Offered Act of Spiritual Communion prayer and assurance of continued prayers on patient's behalf. Chart reviewed. The following outcomes where achieved:  Patient shared limited information about both his medical narrative and spiritual journey/beliefs.  confirmed Patient's Restoration Affiliation. Patient processed feeling about current hospitalization. Patient expressed gratitude for 's visit. Assessment:  Patient does not have any Rastafarian/cultural needs that will affect patients preferences in health care. There are no spiritual or Rastafarian issues which require intervention at this time. Plan:  Chaplains will continue to follow and will provide pastoral care on an as needed/requested basis.  recommends bedside caregivers page  on duty if patient shows signs of acute spiritual or emotional distress.     [address]   [phone]

## 2022-02-28 NOTE — ED NOTES
Pt transported via stretcher upstairs to [place] [address] by Transporter. All belongings sent with pt.

## 2022-02-28 NOTE — ED NOTES
Critical Result Notification    Received and verbally repeated the following test results POC  with repeat BS 46      Admitting MD was notified and provided a verbal readback of the results listed above on 2/27(DATE) at 2231 (TIME). Orders were received at this time.       Salomon Ascencio RN

## 2022-02-28 NOTE — ED NOTES
Orthostatic BP's performed per MDs verbal orders     Supine  /68- R arm  HR 68    Sitting  /92  HR 81    Standing  /84  HR 78      Past Medical History:   Diagnosis Date    HTN (hypertension)     Hypertension

## 2022-02-28 NOTE — H&P
Hospitalist Admission History and Physical    NAME:  Major Keepers   :   1955   MRN:   [id number]     PCP:  Rand Phillips MD  Date/Time:  2022 11:16 PM    Subjective:   CHIEF COMPLAINT: Generalized weakness    HISTORY OF PRESENT ILLNESS:     Jing Chavez is a 74 y.o. [place] male with PMH of hypertension, uncontrolled diabetes, prostate cancer with some metastasis to bone who presents with generalized weakness earlier today. Patient states he was in his normal state of health when he was discharged from [facility] yesterday, states he was able to ambulate to his son's car proceeded to go home. He thereafter states he woke this morning at approximately 1:30 AM to go to the bathroom and he required a large amount of assistance from his son, around this time he also had some nausea with small amount of vomiting prompting presentation to ED. He denies any other GI concerns including no vomiting blood, no bloody stools or diarrhea, no constipation. He does admit to dizziness with position changes, he notes that his blood pressure is often low at home and he does not take his blood pressure pills as a result. He also feels his Metformin is \"too much for me\" and will only take this once a day at best.  He is uncertain of other medicines that he takes other than having his amlodipine and his back at the bedside. He states that he takes his blood sugar at home, and it was 100 earlier today however when he got to ER shortly thereafter was 250. He is otherwise without complaints including no chest pain, shortness of breath.     Patient Active Problem List   Diagnosis Code    Lightheadedness R42    Weakness R53.1       Past Medical History:   Diagnosis Date    Diabetes ([address]     HTN (hypertension)     Hypertension     Prostate cancer [facility])        Past Surgical History:   Procedure Laterality Date    HX OTHER SURGICAL  2020    L1 Vertebral Body Bx       Social History     Tobacco Use  Smoking status: Never Smoker    Smokeless tobacco: Never Used   Substance Use Topics    Alcohol use: Never        History reviewed. No pertinent family history. No Known Allergies     Prior to Admission Medications   Prescriptions Last Dose Informant Patient Reported? Taking? Erleada 60 mg tablet   No No   Sig: TAKE 4 TABLETS (240MG) BY  MOUTH ONCE DAILY WITH OR  WITHOUT FOOD   amLODIPine (NORVASC) 10 mg tablet 2/20/2022 at Unknown time  No Yes   Sig: Take 1 Tab by mouth daily. bisacodyl (BISCOLAX) 10 mg suppository   No No   Sig: Insert 10 mg into rectum daily. diclofenac (VOLTAREN) 1 % gel   Yes No   Sig: Apply 2 g to affected area four (4) times daily. empagliflozin (Jardiance) 10 mg tablet   Yes Yes   Sig: Take 10 mg by mouth daily as needed. ergocalciferol (ERGOCALCIFEROL) 1,250 mcg (50,000 unit) capsule   No No   Sig: Take 1 Capsule by mouth every seven (7) days. indapamide (LOZOL) 2.5 mg tablet   Yes No   Sig: Take 2.5 mg by mouth Every morning. lisinopril (PRINIVIL, ZESTRIL) 5 mg tablet   No No   Sig: Take 1 Tab by mouth daily. metFORMIN ER (GLUCOPHAGE XR) 500 mg tablet 2/20/2022 at Unknown time  Yes Yes   Sig: Take 500 mg by mouth daily. polyethylene glycol (MIRALAX) 17 gram/dose powder   No No   Sig: Take 17 g by mouth two (2) times a day. 1 tablespoon with 8 oz of water daily   senna (SENOKOT) 8.6 mg tablet   Yes Yes   Sig: Take 8.6 mg by mouth two (2) times daily as needed. spironolactone (ALDACTONE) 25 mg tablet   Yes No   Sig: Take 25 mg by mouth daily. Facility-Administered Medications: None     REVIEW OF SYSTEMS:     [] Unable to obtain  ROS due to  []mental status change  []sedated   []intubated   [x]Total of 12 systems reviewed as follows:    GENERAL: no fever or chills; + generalized weakness  HEENT: no sinus congestion, hearing / vision changes  NECK: No pain or stiffness.    PULMONARY: no shortness of breath or cough  Cardiovascular: denies palpitations or chest pain; no lower extremity edema; + dizziness with position changes  GASTROINTESTINAL: Denies abdominal pain, constipation, diarrhea, nausea, vomiting or melena / bloody stools  GENITOURINARY: No urinary frequency, urgency, hesitancy or dysuria. MUSCULOSKELETAL: no back / joint or muscle pain, no recent trauma. DERMATOLOGIC: denies pruritis, rashes or open areas   ENDOCRINE: No polyuria, polydipsia, no heat or cold intolerance. HEMATOLOGICAL: No easy bruising or bleeding. NEUROLOGIC:  no focal weakness,  no speech changes     Objective:   VITALS:    Visit Vitals  BP 92/63 (BP 1 Location: Right upper arm, BP Patient Position: Lying left side)   Pulse 75   Temp 98.7 °F (37.1 °C)   Resp 16   Ht 5' 7\" (1.702 m)   Wt 59.4 kg (131 lb)   SpO2 100%   BMI 20.52 kg/m²     Temp (24hrs), Av.6 °F (37 °C), Min:98.5 °F (36.9 °C), Max:98.7 °F (37.1 °C)      PHYSICAL EXAM:   General:          Alert, in NAD, frail appearing  HEENT:          Sclera anicteric. Conjunctiva pink. Mucous membranes                          Moist, no ear or nasal discharge  Neck:               Supple. Trachea midline. No accessory muscle use. no jugular venous distention, no carotid bruit  CV:                  Regular rate and rhythm. S1S2+  Lungs:             Clear to auscultation bilaterally. No Wheezing or Rhonchi. No rales. Abdomen:        Soft, non-tender. Not distended. Bowel sounds normal.   Extremities:     No cyanosis. No edema. Pulses 2+ b/l  Neurologic:      Alert and oriented X 4. Follows commands, responds appropriately. No focal neurological deficit was noted  Skin:                Warm and dry. No rashes.      LAB DATA REVIEWED:    No components found for: GLPOC  Recent Labs     22  0930   *   K 3.6   CL 99*   CO2 25   BUN 25*   CREA 1.26   *   PHOS 3.5   CA 9.2   ALB 3.4   WBC 4.8   HGB 13.9   HCT 40.4          IMAGING RESULTS:     [x]  I have personally reviewed the actual [x]CXR  [x]CT scan / MRI    Assessment/Plan:      Active Problems:    Lightheadedness (2/27/2022)      Weakness (2/27/2022)       ___________________________________________________  PLAN:    1. Generalized weakness -patient reports difficulty ambulating since early this morning, MRI brain / spine reviewed and without clear etiology, requested neurology to see in a.m. or any other ? Neurologic cause, check TSH in a.m., PT OT consulted  2. Type 2 diabetes with hyperglycemia -recent discharge from [facility] were on insulin drip with A1c of 19.9. Patient was discharged with oral meds due to refusal to take insulin at home. Discussed with patient and he is agreeable to taking insulin while inpatient and have encouraged strongly to take insulin at home. Diabetes educator requested, SSI and Lantus initiated  3. Hypotension with history of hypertension -patient reports some periods of dizziness with position changes, initiate IV hydration, orthostatics, hold all blood pressure meds for now  4. Prostate cancer with metastasis to bone -per chart review followed by Dr. Catalina Keane as outpatient, patient reports difficulty getting his chemo Diania Nearing) for the past 2 to 3 months due to insurance issues. Case management consult requested  5. Underweight - Body mass index is 20.52 kg/m².,  Likely in setting of poorly controlled diabetes and prostate cancer. Dietitian consulted  6. CODE STATUS discussed with patient and he is somewhat dismissive, states he will focus on gaining his strength at this point. Discussed with patient will continue full code until states otherwise    Patient defers call to family (son) this evening.     Consults called / follow up - neurology     Prophylaxis:  [x]Lovenox  []Coumadin  []Hep SQ  []SCDs  []H2B/PPI    Discussed Code Status:    [x]Full Code      []DNR     ___________________________________________________  Admitting Provider: Anju Nina NP

## 2022-02-28 NOTE — ED NOTES
Assumed care of pt post report from Sharri, [facility]    Pt lying on stretcher. Reports Meal tray was \"garbage\". Requesting different food. Pt reports he will attempt to have family bring him food. Pt voided 600 mL clear yellow urine in urinal.   Emptied urinal and replaced at bedside. Documented in I/O's.

## 2022-02-28 NOTE — PROGRESS NOTES
Problem: Mobility Impaired (Adult and Pediatric)  Goal: *Acute Goals and Plan of Care (Insert Text)  Description: Physical Therapy Goals  Initiated 2022 and to be accomplished within 7 day(s)  1. Patient will move from supine to sit and sit to supine  in bed with modified independence. 2.  Patient will transfer from bed to chair and chair to bed with modified independence using the least restrictive device. 3.  Patient will perform sit to stand with modified independence. 4.  Patient will ambulate with modified independence for 150 feet with the least restrictive device. 5.  Patient will ascend/descend 12 stairs with handrail(s) with supervision. PLOF: Patient was independent with mobility. He rents a room in a 2 story house. Outcome: Progressing Towards Goal       PHYSICAL THERAPY EVALUATION    Patient: Edilson Duvall (71 y.o. male)  Date: 2022  Primary Diagnosis: Lightheadedness [R42]  Weakness [R53.1]  Physical deconditioning [R53.81]        Precautions:   Fall    ASSESSMENT :  Based on the objective data described below, the patient presents with decreased balance reactions, orthostatic hypotension, and fait deficits. Supine to sit transfer with supervision and denies dizziness. He has good BLE strength 4/5. Patient stood with CGA for safety and he displayed unsteadiness. Pt with symptomatic hypotension when transferred to standing. He is able to steps with HHA to the chair. Narrow GEORGIE. Verbal cues for safety as he attempts to sit down before backing up all the way to the chair. Pt verbalizes understanding to not get up on his own and to call for nursing assistance with mobility. Call bell in reach and pt resting in the chair. BP supine: 113/80        Sittin/85        Standin/55    Patient will benefit from skilled intervention to address the above impairments.   Patient's rehabilitation potential is considered to be Good  Factors which may influence rehabilitation potential include:   []         None noted  []         Mental ability/status  [x]         Medical condition  []         Home/family situation and support systems  []         Safety awareness  []         Pain tolerance/management  []         Other:      PLAN :  Recommendations and Planned Interventions:   [x]           Bed Mobility Training             [x]    Neuromuscular Re-Education  [x]           Transfer Training                   []    Orthotic/Prosthetic Training  [x]           Gait Training                          []    Modalities  [x]           Therapeutic Exercises           []    Edema Management/Control  [x]           Therapeutic Activities            [x]    Family Training/Education  [x]           Patient Education  []           Other (comment):    Frequency/Duration: Patient will be followed by physical therapy 1-2 times per day/4-7 days per week to address goals. Discharge Recommendations: Home Health with increased supervision   Further Equipment Recommendations for Discharge: rolling walker     SUBJECTIVE:   Patient stated I feel better today .     OBJECTIVE DATA SUMMARY:     Past Medical History:   Diagnosis Date    Diabetes ([address]     HTN (hypertension)     Hypertension     Prostate cancer [facility])      Past Surgical History:   Procedure Laterality Date    HX OTHER SURGICAL  09/01/2020    L1 Vertebral Body Bx     Barriers to Learning/Limitations: None  Compensate with: N/A  Home Situation:  Home Situation  Home Environment: Private residence  # Steps to Enter: 1  One/Two Story Residence: One story  Living Alone: Yes  Support Systems: Child(maged)  Patient Expects to be Discharged to[de-identified] Home  Current DME Used/Available at Home: None  Tub or Shower Type: Tub/Shower combination  Critical Behavior:  Neurologic State: Alert  Orientation Level: Oriented X4  Cognition: Follows commands  Safety/Judgement: Fall prevention  Psychosocial  Patient Behaviors: Calm; Cooperative; Talkative  Purposeful Interaction: Yes  Pt Identified Daily Priority: Clinical issues (comment) (poc)  Caritas Process: Nurture loving kindness;Establish trust;Teaching/learning; Attend basic human needs;Create healing environment  Caring Interventions: Reassure; Therapeutic modalities  Reassure: Therapeutic listening; Informing;Quiet presence; Sit with patient;Caring rounds  Therapeutic Modalities: Humor; Intentional therapeutic touch  Skin Condition/Temp: Dry;Flaky     Skin Integrity: Intact  Skin Integumentary  Skin Condition/Temp: Dry;Flaky  Skin Integrity: Intact     Strength:    Strength: Generally decreased, functional                    Tone & Sensation:   Tone: Normal              Sensation: Intact               Range Of Motion:  AROM: Within functional limits                Functional Mobility:  Bed Mobility:     Supine to Sit: Supervision     Scooting: Supervision    Transfers:  Sit to Stand: Contact guard assistance  Stand to Sit: Minimum assistance (for safety as pt sititng prematurely)             Balance:   Sitting: Intact  Standing: Impaired; With support  Standing - Static: Fair  Standing - Dynamic : Fair;Occasional       Ambulation/Gait Training:  Distance (ft): 5 Feet (ft)  Assistive Device: Other (comment) (HHA)  Ambulation - Level of Assistance: Contact guard assistance  Gait Abnormalities: Decreased step clearance  Base of Support: Narrowed       Pain:  Pain level pre-treatment: 0/10   Pain level post-treatment: 0/10   Pain Intervention(s) : Medication (see MAR); Rest, Ice, Repositioning  Response to intervention: Nurse notified, See doc flow    Activity Tolerance:   Fair, limited by symptomatic hypotension   Please refer to the flowsheet for vital signs taken during this treatment.   After treatment:   [x]         Patient left in no apparent distress sitting up in chair  []         Patient left in no apparent distress in bed  [x]         Call bell left within reach  [x]         Nursing notified  []         Caregiver present  [] Bed alarm activated  []         SCDs applied    COMMUNICATION/EDUCATION:   [x]         Role of Physical Therapy in the acute care setting. [x]         Fall prevention education was provided and the patient/caregiver indicated understanding. [x]         Patient/family have participated as able in goal setting and plan of care. [x]         Patient/family agree to work toward stated goals and plan of care. []         Patient understands intent and goals of therapy, but is neutral about his/her participation. []         Patient is unable to participate in goal setting/plan of care: ongoing with therapy staff.  []         Other:     Thank you for this referral.  Paris Soto, PT   Time Calculation: 23 mins      Eval Complexity: History: LOW Complexity : Zero comorbidities / personal factors that will impact the outcome / POCExam:LOW Complexity : 1-2 Standardized tests and measures addressing body structure, function, activity limitation and / or participation in recreation  Presentation: LOW Complexity : Stable, uncomplicated  Clinical Decision Making:Low Complexity    Overall Complexity:LOW

## 2022-02-28 NOTE — DIABETES MGMT
Diabetes Patient/Family Education Record    Factors That May Influence Patients Ability to Learn or Comply with Recommendations   []   Language barrier    []   Cultural needs   []   Motivation    []   Cognitive limitation    []   Physical   [x]   Education - pt with misinformation regarding diabetes medications. []   Physiological factors   []   Hearing/vision/speaking impairment   []   Adventist beliefs    []   Financial factors   []  Other:   []  No factors identified at this time. Person Instructed:   [x]   Patient   []   Family   []  Other     Preference for Learning:   [x]   Verbal   [x]   Written  Diabetes Education Book  Target A1C and BG  BG logs  hypoglycemia   [x]  Demonstration- insulin administration with insulin pens and use of meter. Level of Comprehension & Competence:    []  Good                                      [x] Fair                                     []  Poor                             []  Needs Reinforcement   [x]  Teach back completed    Education Component:   [x]  Medication management, including how to administer insulin (if appropriate) and potential medication interactions   Insulin administration with insulin pens   [x]  Nutritional management - [] Obtained usual meal pattern   []   Basic carbohydrate counting  []  Plate method  [x]  Limit concentrated sweets and avoid sweetened beverages  []  Portion control  [x]    Avoid skipping meals   []  Exercise   [x]  Signs, symptoms, and treatment of hyperglycemia and hypoglycemia   [x] Prevention, recognition and treatment of hyperglycemia and hypoglycemia   4. [x]  Importance of blood glucose monitoring  [x] Blood Glucose targets   [x]   Provided patient with blood glucose meter - Pt provided with Contour Next EZ glucometer and 20 test strips, 20 lancets to tie him over until he can obtain a prescription for lancets and test strips.   [x]  Has glucometer and supplies at home - has a meter and about 6 test strips left. [x]  Instruction on use of the blood glucose meter and recommended monitoring schedule   [x]  Discuss the importance of HbA1C monitoring. Patients A1c is > 14.0 %. This is equivalent to average glucose of > 360  mg/dl for the past 2-3 months.   []  Sick day guidelines   [x]  Proper use and disposal of lancets, needles, syringes or insulin pens (if appropriate)   [x]  Potential long-term complications (retinopathy, kidney disease, neuropathy, foot care)   [] Information about whom to contact in case of emergency or for more information    [x]  Goal:  Patient/family will demonstrate understanding of Diabetes Self- Management Skills by: (date) ___3/15/22____  Plan for post-discharge education or self-management support:    [] Outpatient class schedule provided            [] Patient Declined    [] Scheduled for outpatient classes (date) _______    [x] Written information provided  Verify: [x] Prior to admission Diabetes medications    Does patient understand how diabetes medications work? ____no, poor understanding________________________  Does patient have difficulty obtaining diabetes medications or testing supplies?  ___no______________     Gian oGmes MS, RD, [facility]  Diabetes and Glycemic Control   [employer]

## 2022-03-01 ENCOUNTER — HOME HEALTH ADMISSION (OUTPATIENT)
Dept: HOME HEALTH SERVICES | Facility: HOME HEALTH | Age: 67
End: 2022-03-01
Payer: MEDICARE

## 2022-03-01 NOTE — HOME CARE
Received home health referral for [facility] for (SN, PT, OT). Spoke with patient in room and also spoke with son;  patient identifiers verified. Explained home care services and routines. Demographics verified including insurance, phone and address confirmed. Patient has the following DME: none. Caregivers available son Shabbir Bunch to check often. Patient no longer drives. Son takes patient to all of MD appointments. Orders noted and arranged to be processed to central intake.      ---   Surekha Pryor LPN  [facility] - INPATIENT Liaison

## 2022-03-02 ENCOUNTER — HOME CARE VISIT (OUTPATIENT)
Dept: HOME HEALTH SERVICES | Facility: HOME HEALTH | Age: 67
End: 2022-03-02

## 2022-03-02 ENCOUNTER — HOME CARE VISIT (OUTPATIENT)
Dept: SCHEDULING | Facility: HOME HEALTH | Age: 67
End: 2022-03-02
Payer: MEDICARE

## 2022-03-02 VITALS
SYSTOLIC BLOOD PRESSURE: 125 MMHG | DIASTOLIC BLOOD PRESSURE: 60 MMHG | HEART RATE: 78 BPM | RESPIRATION RATE: 16 BRPM | TEMPERATURE: 97.8 F | OXYGEN SATURATION: 100 %

## 2022-03-02 PROCEDURE — 3331090002 HH PPS REVENUE DEBIT

## 2022-03-02 PROCEDURE — 3331090001 HH PPS REVENUE CREDIT

## 2022-03-02 PROCEDURE — 400013 HH SOC

## 2022-03-02 PROCEDURE — G0299 HHS/HOSPICE OF RN EA 15 MIN: HCPCS

## 2022-03-02 PROCEDURE — 400018 HH-NO PAY CLAIM PROCEDURE

## 2022-03-02 NOTE — Clinical Note
[address] [facility] [place]    51-year-old [place] male past medical history of HTN, DM, and prostate cancer with metastases admitted with generalized weakness and recently discharged from [facility] with possible DKA. 'S--A1C 19.9- Pt went home and was very weak and dizzy and stated he was throwing up, and urinated on himself. Pt son called ambulance and he went to [address] [place] [address]. Pt states he has dizzines upon standing up and with position changes and his BP drops. pt needs assistance with getting his prostate cancer drug Erleada 60 mg tablet delivered from [employer]. Pt has had insurance problems with getting his cancer meds, and he hasnt taken them in 2-3 months. RN called [employer] and they were going to deliver today 3/2. Also insulin pen needles were not in his home. [employer] will deliver tomorrow. Also called  at [facility], Alexei Alejandra, to assist pt in mailing his housing applications to the right address. Called Alexei Alejandra and left a message. Pt would like to move out of the area (not very safe). Pt states he has 5 children, but they dont help him much bc they have their own families and are busy. He tries to do as much as he can on his own. Pt uses public transportation to get to MD appts etc. Pt orders groceries from [employer] and has them delivered. Will cont to follow pt for a few weeks to make sure he is compliant with insulin and all meds are in the home.      Rosalva Llaa RN, BSN

## 2022-03-03 ENCOUNTER — HOME CARE VISIT (OUTPATIENT)
Dept: HOME HEALTH SERVICES | Facility: HOME HEALTH | Age: 67
End: 2022-03-03
Payer: MEDICARE

## 2022-03-03 ENCOUNTER — HOME CARE VISIT (OUTPATIENT)
Dept: SCHEDULING | Facility: HOME HEALTH | Age: 67
End: 2022-03-03
Payer: MEDICARE

## 2022-03-03 VITALS
HEART RATE: 73 BPM | OXYGEN SATURATION: 100 % | RESPIRATION RATE: 16 BRPM | DIASTOLIC BLOOD PRESSURE: 88 MMHG | TEMPERATURE: 97.8 F | SYSTOLIC BLOOD PRESSURE: 140 MMHG

## 2022-03-03 PROCEDURE — 3331090001 HH PPS REVENUE CREDIT

## 2022-03-03 PROCEDURE — G0151 HHCP-SERV OF PT,EA 15 MIN: HCPCS

## 2022-03-03 PROCEDURE — 3331090002 HH PPS REVENUE DEBIT

## 2022-03-03 NOTE — HOME HEALTH
Skilled services/Home bound verification: SN FOR DM, PROSTATE CA MEDICATION AND DISEASE MGMT     recent ED visit:   65-year-old [place] male past medical history of HTN, DM, and prostate cancer with metastases admitted with generalized weakness and recently discharged from [facility] with possible DKA. 'S--A1C 19.9- Pt went home and was very weak and dizzy and stated he was throwing up, and urinated on himself. Pt son called ambulance and he went to [facility]. Pt states he has dizzines upon standing up and with position changes and his BP drops. pt needs assistance with getting his prostate cancer drug Erleada 60 mg tablet delivered from [employer]. Pt has had insurance problems with getting his cancer meds, and he hasnt taken them in 2-3 months. RN called [employer] and they were going to deliver today 3/2. Also insulin pen needles were not in his home. [facility] will deliver tomorrow. Also called  at [facility], Mark Myers, to assist pt in mailing his housing applications to the right address. Called Mark Myers and left a message. Pt would like to move out of the area (not very safe). Pt states he has 5 children, but they dont help him much bc they have their own families and are busy. He tries to do as much as he can on his own. Pt uses public transportation to get to MD appts etc. Pt orders groceries from [facility] and has them delivered. Will cont to follow pt for a few weeks to make sure he is compliant with insulin and all meds are in the home. Skilled Reason for admission/summary of clinical condition: SN FOR DM TEACHING, INSULIN TEACHING, GETTING ALL MEDS IN HOME. This patient is homebound for the following reasons Requires considerable and taxing effort to leave the home , Requires the assistance of 1 or more persons to leave the home  and Only leaves the home for medical reasons or Yazidi services and are infrequent and of short duration for other reasons . Caregiver: relative. patients cg is son and he is available as needed or assistance with IADL's, ADL's, meal prep, medication management, and taking patient to all doctors appointments. Medications reconciled and all medications are not available in the home this visit. see note on meds list   The following education was provided regarding medications, medication interactions, and look alike medications (specify): aldactone, senna   Medications  are unable to assess effectiveness at this time. High risk medication teaching regarding anticoagulants, hyperglycemic agents or opiod narcotics performed (specify) percocet, lantus 20 u, metformin. discussed diabetic diet, s/s of hypoglycemia, hyperglycemia- who to report to/when, DM management with glucometer and BS checks, insulin management, preparation and injection administration. all discrepancies/medication interactions noted and cc sent to PCP     Home health supplies by type and quantity ordered/delivered this visit include: n/a   Patient education provided this visit to include: see interventions       Patient/caregiver degree of understanding:good    Patient level of understanding of education provided: patient has a good understanding of education at this time    Patient response to procedure performed: N/A    Home exercise program/Homework provided: PER PT     Pt/Caregiver instructed on plan of care and are agreeable to plan of care at this time. Physician DR Wallace Sessions  notified of patient admission to home health and plan of care including anticipated frequency of 1W3, 2PRN and treatments/interventions/modalities of DM medication and disease mgmt     Discharge planning discussed with patient and caregiver. Discharge planning as follows: Patient will be discharged once education has completed, patient is medically stable and pt/cg are able to independently manage medications and disease process. Pt/Caregiver did verbalize understanding of discharge planning. Next MD appointment Dr. Margareth Sears on 3/9 at 1430.   Patient/caregiver encouraged/instructed to keep appointment as lack of follow through with physician appointment could result in discontinuation of home care services for non-compliance

## 2022-03-03 NOTE — Clinical Note
Clay Linton is a 73 yo male with two recent hospitalizations. Initial hospitalization was result of possible DKA. Pt reports returning to hospital as a result of sudden onset weakness/vomiting. PMH includes: HTN, DM, and prostate cancer. Pt currently presents with generalized deconditioning and impaired balance which places him at increased risk for falls. Pt will benefit from home health PT to improve strength and balance. PT POC 1w1, 2w2, 1w1.  -Request made for MSW evaluation.   .  Therapy Functional Score Assessment  Question   Score   Grooming  1    Lower Dressing 2      Bathing  3      Toilet Transfer  1    Transfer  1            Ambulation  3   Dyspnea                     1      Pain Interfering with activity 0  Est number therapy visits      6

## 2022-03-03 NOTE — HOME HEALTH
SUMMARY: Kendrick Hargrove is a 75 yo male with two recent hospitalizations. Initial hospitalization was result of possible DKA. Pt reports returning to hospital as a result of sudden onset weakness/vomiting. PMH includes: HTN, DM, and prostate cancer. Pt currently presents with generalized deconditioning and impaired balance which places him at increased risk for falls. Pt will benefit from home health PT to improve strength and balance. PT POC 1w1, 2w2, 1w1. Sydni Remedies PRECAUTIONS: Fall risk, diabetes  SUBJECTIVE: Pt reports he is doing ok, but his balance is off. LIVING SITUATION: Pt lives in a two story home with bedroom on second floor. 3 steps to enter. REQUIRES CAREGIVER ASSISTANCE FOR: Pt denies having any regular assistance. He would benefit from assist to safely enter/exit home and for ADLs as needed. PLOF: Pt reports he was fully independent without an AD. MEDICATIONS REVIEWED AND UPDATED: Pt denies any medication changes since nursing visit yesterday. NEXT MD APPT: Pt reports PCP follow up today at 11am.   .  WOUNDS: None  ROM: LE ROM WFL for ADLs. STRENGTH:  Seated hip flx 3+/5, seated hip abd 4/5, seated hip add 4-/5, knee flx/ext 4+/5   BED MOBILITY: Pt demonstrated safe technique for sit to supine/supine to sit. TRANSFERS: Sit to stand/stand to sit training performed with verbal cues for hand placement. GAIT: Gait training on even surfaces x 65 feet without an AD. Gait characterized by decreased foot clearance bilaterally with patient reaching for walls/furniture for stability. Intermittent lateral deviation noted during directional changes. Recommend use of cane. STAIRS: Pt negotiated full flight of stairs using reciprocal pattern and rail. Poor foot clearance noted with increased difficulty after approximately 6 steps. BALANCE: Pt scored 13/28 on Tinetti Balance Assessment placing patient at increased risk for falls. Pt unable to perform SLS.   Pt unable to achieve tandem stance positioning without LOB. Instability noted with narrow base of support and with eyes closed. Destin Schreiber PATIENT EDUCATION PROVIDED THIS VISIT: safety, HEP, walking, deep breathing, fall risk/ prevention strategies. HEP consisting of:  1. Walking with assistance. 2. Deep breathing throughout day. 3. 2-3x daily as tolerated: quad/glute sets 15x5\", supine hip abd 10x, supine heel slide 10x, SAQ 10x, LAQ 15x, seated march 10x. Written instructions issued. Patient/caregiver verbalized understanding. Patient level of understanding of education provided: Pt was able to return demonstration for all HEP exercises. Patient response to treatment: Pt was fatigued by stair negotiation, but was without any signs/symptoms of distress. .  CONTINUED NEED FOR THE FOLLOWING SKILLS: HH PT is medically necessary to address pain, decreased ROM, decreased strength, increased swelling, impaired bed mobility, decreased independence with functional transfers, impaired gait, impaired stair negotiation, and impaired balance in order to improve functional independence, quality of life, return to PLOF, and reduce the risk for falls. PLAN: 1w1, 2w2, 1w1. DISCHARGE PLANNING DISCUSSED: Discharge to self and family under MD supervision once all goals have been met or patient has reached max potential.  .  Pt asked if there is a way to get help with things such as food stamps. Request made for MSW order. PCP: Dominic Lopez was called and message was left informing of PT POC.

## 2022-03-03 NOTE — Clinical Note
Art Alaniz is a 73 yo male with two recent hospitalizations. Initial hospitalization was result of possible DKA. Pt reports returning to hospital as a result of sudden onset weakness/vomiting. PMH includes: HTN, DM, and prostate cancer. Pt currently presents with generalized deconditioning and impaired balance which places him at increased risk for falls. Pt will benefit from home health PT to improve strength and balance. PT POC 1w1, 2w2, 1w1.   -I told patient I would check the office to see if we had a SPC. Please let him know that when I looked today there wasn't one.    -He was also asking me about food stamps. Tricia Moraes is getting an order for MSW.

## 2022-03-04 PROCEDURE — 3331090002 HH PPS REVENUE DEBIT

## 2022-03-04 PROCEDURE — 3331090001 HH PPS REVENUE CREDIT

## 2022-03-05 PROCEDURE — 3331090002 HH PPS REVENUE DEBIT

## 2022-03-05 PROCEDURE — 3331090001 HH PPS REVENUE CREDIT

## 2022-03-06 PROCEDURE — 3331090002 HH PPS REVENUE DEBIT

## 2022-03-06 PROCEDURE — 3331090001 HH PPS REVENUE CREDIT

## 2022-03-07 ENCOUNTER — HOME CARE VISIT (OUTPATIENT)
Dept: SCHEDULING | Facility: HOME HEALTH | Age: 67
End: 2022-03-07
Payer: MEDICARE

## 2022-03-07 ENCOUNTER — HOME CARE VISIT (OUTPATIENT)
Dept: HOME HEALTH SERVICES | Facility: HOME HEALTH | Age: 67
End: 2022-03-07
Payer: MEDICARE

## 2022-03-07 VITALS
SYSTOLIC BLOOD PRESSURE: 122 MMHG | DIASTOLIC BLOOD PRESSURE: 84 MMHG | HEART RATE: 75 BPM | OXYGEN SATURATION: 96 % | TEMPERATURE: 97.4 F

## 2022-03-07 PROCEDURE — 3331090002 HH PPS REVENUE DEBIT

## 2022-03-07 PROCEDURE — 3331090001 HH PPS REVENUE CREDIT

## 2022-03-07 PROCEDURE — G0157 HHC PT ASSISTANT EA 15: HCPCS

## 2022-03-07 NOTE — HOME HEALTH
SUBJECTIVE: The pt answered the door without the use of an AD, in a timely manner. PAIN: see pain assessment  OBJECTIVE: see interventions    NEXT MD APPT: 3/9/22 [facility]    CAREGIVER ASSISTANCE NEEDED FOR: the pt lives in a group home. He has children in the area that can help if needed: transportation, shopping, meals, medication management. ASSESSMENT AND PROGRESS TOWARD GOALS:  The pt presents with weakness and pain with stairs creating increased fall risks. The pt will benefit from a stretching, strengthening and balance training program for reducing the risks of future falls and rehospitalizations. mild SOB and fatigue noted with today's visit. The pt will benefit from continued HHPT to increase strength, balance and endurance improving the safe ability to perform functional mobility safely. PATIENT RESPONSE TO TREATMENT: 4/10 moises knee pain with stairs, 0/10 pain reported post PT. PATIENT LEVEL OF UNDERSTANDING OF EDUCATION: Good - the pt able to teach back the HEP regimen    PLAN: plan to address outside ambulation with the [facility] next visit. DISCHARGE PLANNING DISCUSSED: Discharge to self and family under MD supervision once all goals have been met or patient has reached maximum potential. Discussed with the pt the POC to continue HHPT x 1 visit this week then 2w1, 1w1.

## 2022-03-08 PROCEDURE — 3331090002 HH PPS REVENUE DEBIT

## 2022-03-08 PROCEDURE — 3331090001 HH PPS REVENUE CREDIT

## 2022-03-09 ENCOUNTER — HOME CARE VISIT (OUTPATIENT)
Dept: SCHEDULING | Facility: HOME HEALTH | Age: 67
End: 2022-03-09
Payer: MEDICARE

## 2022-03-09 VITALS
DIASTOLIC BLOOD PRESSURE: 90 MMHG | TEMPERATURE: 97.3 F | OXYGEN SATURATION: 99 % | SYSTOLIC BLOOD PRESSURE: 128 MMHG | HEART RATE: 83 BPM

## 2022-03-09 PROCEDURE — G0157 HHC PT ASSISTANT EA 15: HCPCS

## 2022-03-09 PROCEDURE — 3331090002 HH PPS REVENUE DEBIT

## 2022-03-09 PROCEDURE — 3331090001 HH PPS REVENUE CREDIT

## 2022-03-09 NOTE — HOME HEALTH
SUBJECTIVE: The pt is just waking upon today's arrival.     PAIN: see pain assessment    OBJECTIVE: see interventions    NEXT MD APPT: 3/9/22 [facility] urology    CAREGIVER ASSISTANCE NEEDED FOR: the pt lives in a group home. He has children in the area that can help if needed: transportation, shopping, meals, medication management. ASSESSMENT AND PROGRESS TOWARD GOALS:  The pt is progressing nicely towards the resolution of HHPT goals. Stair goal met for the inside stairs. Unable to address the outside stairs and outside ambulation due to inclement weather. The pt reports increased fatigue with an RPE of 6/10 post activity, but able to maintain SpO2 at 91% and above throughout the visit today. The pt will benefit from 3 more HHPT visits to ensure safety with functional mobility and the return to community ambulation, reducing his risks of falls and rehospitalizations. PATIENT RESPONSE TO TREATMENT: 0/10 pain level pre PT, 0/10 pain level post PT. 6/10 RPE. PATIENT LEVEL OF UNDERSTANDING OF EDUCATION: Good - the pt able to teach back the HEP regimen    PLAN: plan to address outside ambulation with the [facility] next visit. DISCHARGE PLANNING DISCUSSED: Discharge to self and family under MD supervision once all goals have been met or patient has reached maximum potential. Discussed with the pt the POC to continue HHPT 2w1, 1w1. The pt is in agreement to the POC at this time.

## 2022-03-10 PROCEDURE — 3331090002 HH PPS REVENUE DEBIT

## 2022-03-10 PROCEDURE — 3331090001 HH PPS REVENUE CREDIT

## 2022-03-11 ENCOUNTER — HOME CARE VISIT (OUTPATIENT)
Dept: SCHEDULING | Facility: HOME HEALTH | Age: 67
End: 2022-03-11
Payer: MEDICARE

## 2022-03-11 PROCEDURE — 3331090002 HH PPS REVENUE DEBIT

## 2022-03-11 PROCEDURE — 3331090001 HH PPS REVENUE CREDIT

## 2022-03-11 PROCEDURE — G0299 HHS/HOSPICE OF RN EA 15 MIN: HCPCS

## 2022-03-11 PROCEDURE — G0155 HHCP-SVS OF CSW,EA 15 MIN: HCPCS

## 2022-03-11 NOTE — HOME HEALTH
MSW provided the pt documentation of social service resources, 211 (resource hotline), [facility], and support programs. MSW also provided information about care assistance options/costs/funding sources and to apply for SNAP (food) assistance. which pt stated he would have his son assist with the application. MSW discussed HRT paratransit and offered to assist with the application, if interested, and invited a return call if additional resource questions arise.

## 2022-03-12 PROCEDURE — 3331090002 HH PPS REVENUE DEBIT

## 2022-03-12 PROCEDURE — 3331090001 HH PPS REVENUE CREDIT

## 2022-03-13 VITALS
RESPIRATION RATE: 16 BRPM | HEART RATE: 76 BPM | DIASTOLIC BLOOD PRESSURE: 70 MMHG | TEMPERATURE: 98 F | OXYGEN SATURATION: 100 % | SYSTOLIC BLOOD PRESSURE: 115 MMHG

## 2022-03-13 PROCEDURE — 3331090001 HH PPS REVENUE CREDIT

## 2022-03-13 PROCEDURE — 3331090002 HH PPS REVENUE DEBIT

## 2022-03-13 NOTE — HOME HEALTH
Skilled reason for visit: routine dm teaching visit, meds, pain, and vs     Caregiver involvement: patients cg is family and they are available as needed or assistance with IADL's, ADL's, meal prep, medication management, and taking patient to all doctors appointments. Medications reviewed and all medications are available in the home this visit. The following education was provided regarding medications, medication interactions, and look alike medications (specify): insulin   Medications are effective at this time. Medications reconciled and all meds in home    Home health supplies by type and quantity ordered/delivered this visit include: PT [address]      Patient education provided this visit: See interventions    Patient level of understanding of education provided: pt is understanding of all procedures performed. Skilled Care Performed this visit: See interventions    Patient response to procedure performed: n/a    Agency Progress toward goals: See interventions        Patient's Progress towards personal goals: PATIENT IS STEADILY PROGRESSING TOWARDS GOALS, STILL NEEDS REINFORCEMENT/ENCOURAGEMENT. WILL CONTINUE TO MONITOR. Home exercise program: continue home exercises program as developed by physical therapy     Continued need for the following skills: Nursing and Physical Therapy    Plan for next visit: discharge  Patient and/or caregiver notified and agrees to changes in the Plan of Care YES      The following discharge planning was discussed with the pt/caregiver: Patient will be discharged once education has completed, patient is medically stable and pt/cg are able to independently manage medications and disease process. pt was given 5 day notice of discharge from nursing and is aware of nursing discharge at pt next visit. pt is in agreement with discharge. pt will remain with PT/OT services.

## 2022-03-14 ENCOUNTER — HOME CARE VISIT (OUTPATIENT)
Dept: SCHEDULING | Facility: HOME HEALTH | Age: 67
End: 2022-03-14
Payer: MEDICARE

## 2022-03-14 VITALS
OXYGEN SATURATION: 96 % | TEMPERATURE: 97.6 F | SYSTOLIC BLOOD PRESSURE: 134 MMHG | DIASTOLIC BLOOD PRESSURE: 90 MMHG | HEART RATE: 78 BPM

## 2022-03-14 PROCEDURE — 3331090002 HH PPS REVENUE DEBIT

## 2022-03-14 PROCEDURE — G0157 HHC PT ASSISTANT EA 15: HCPCS

## 2022-03-14 PROCEDURE — 3331090001 HH PPS REVENUE CREDIT

## 2022-03-14 NOTE — HOME HEALTH
FaiSUBJECTIVE: The pt now living downstairs in the home. PAIN: see pain assessment    OBJECTIVE: see interventions    NEXT MD APPT: 3/16/22 urology    CAREGIVER ASSISTANCE NEEDED FOR: the pt lives in a group home. He has children in the area that can help if needed: transportation, shopping, meals, medication management. ASSESSMENT AND PROGRESS TOWARD GOALS:  The pt is progressing nicely towards HHPT goals as gait and stair goal met on this date without the use of an AD. The pt will benefit from 2 more visits to review and educate on self progression of the HEP and safety with functional mobility. PATIENT RESPONSE TO TREATMENT: no LOB, no SOB and SpO2 at 96% post ambulation. PATIENT LEVEL OF UNDERSTANDING OF EDUCATION: Good - pt able to teach back the addition of a daily walking program to his HEP increasing time/distance as tolerated. PLAN: plan to review and educate on self progression of the HEP, next visit with planned DC for the 1w1 next week. DISCHARGE PLANNING DISCUSSED: Discharge to self and family under MD supervision once all goals have been met or patient has reached maximum potential. Discussed with the pt the POC to continue HHPT x 1 visit this week then 1w1. The pt is in agreement to the POC at this time. [address] reviewed and signed on this date.

## 2022-03-14 NOTE — Clinical Note
thank you   ----- Message -----  From: Mo Hinton, PTA  Sent: 3/14/2022  10:30 AM EDT  To: Andrea Wilson, DELTAR/L      After explaining to the pt what OT is/does/works on he is declining the service.

## 2022-03-15 ENCOUNTER — HOME CARE VISIT (OUTPATIENT)
Dept: HOME HEALTH SERVICES | Facility: HOME HEALTH | Age: 67
End: 2022-03-15
Payer: MEDICARE

## 2022-03-15 VITALS
SYSTOLIC BLOOD PRESSURE: 142 MMHG | TEMPERATURE: 97.8 F | DIASTOLIC BLOOD PRESSURE: 96 MMHG | HEART RATE: 58 BPM | OXYGEN SATURATION: 98 % | RESPIRATION RATE: 18 BRPM

## 2022-03-15 PROCEDURE — 3331090002 HH PPS REVENUE DEBIT

## 2022-03-15 PROCEDURE — G0300 HHS/HOSPICE OF LPN EA 15 MIN: HCPCS

## 2022-03-15 PROCEDURE — 3331090001 HH PPS REVENUE CREDIT

## 2022-03-16 ENCOUNTER — HOME CARE VISIT (OUTPATIENT)
Dept: SCHEDULING | Facility: HOME HEALTH | Age: 67
End: 2022-03-16
Payer: MEDICARE

## 2022-03-16 PROCEDURE — 3331090002 HH PPS REVENUE DEBIT

## 2022-03-16 PROCEDURE — 3331090001 HH PPS REVENUE CREDIT

## 2022-03-16 NOTE — HOME HEALTH
SUBJECTIVE:     PAIN: see pain assessment    OBJECTIVE: see interventions    NEXT MD APPT: 3/16/22 urology    CAREGIVER ASSISTANCE NEEDED FOR: the pt lives in a group home. He has children in the area that can help if needed: transportation, shopping, meals, medication management. ASSESSMENT AND PROGRESS TOWARD GOALS:      PATIENT RESPONSE TO TREATMENT: no LOB, no SOB and SpO2 at 96% post ambulation. PATIENT LEVEL OF UNDERSTANDING OF EDUCATION: Good - pt able to teach back the addition of a daily walking program to his HEP increasing time/distance as tolerated. PLAN:     DISCHARGE PLANNING DISCUSSED: Discharge to self and family under MD supervision once all goals have been met or patient has reached maximum potential. Discussed with the pt the POC to continue HHPT  1w1. The pt is in agreement to the POC at this time. Enx[address] 30 reviewed and signed on this date.

## 2022-03-16 NOTE — CASE COMMUNICATION
I went to the pts home at the time of the scheduled visit, no answer at the door and no answer on the phone while I was waiting at the door. Left msg to return the call.

## 2022-03-17 PROCEDURE — 3331090002 HH PPS REVENUE DEBIT

## 2022-03-17 PROCEDURE — 3331090001 HH PPS REVENUE CREDIT

## 2022-03-18 ENCOUNTER — HOME CARE VISIT (OUTPATIENT)
Dept: SCHEDULING | Facility: HOME HEALTH | Age: 67
End: 2022-03-18
Payer: MEDICARE

## 2022-03-18 PROCEDURE — 3331090001 HH PPS REVENUE CREDIT

## 2022-03-18 PROCEDURE — 3331090002 HH PPS REVENUE DEBIT

## 2022-03-18 NOTE — CASE COMMUNICATION
The pt did not answer the door at the last visit, called him and left voicemail to return the call. He did, but I missed the call and he did not answer the phone. I called again and could not get him. Called today, no answer. Drove by, no answer. Today will be a missed visit.

## 2022-03-19 PROCEDURE — 3331090002 HH PPS REVENUE DEBIT

## 2022-03-19 PROCEDURE — 3331090001 HH PPS REVENUE CREDIT

## 2022-03-20 PROCEDURE — 3331090002 HH PPS REVENUE DEBIT

## 2022-03-20 PROCEDURE — 3331090001 HH PPS REVENUE CREDIT

## 2022-03-21 ENCOUNTER — HOME CARE VISIT (OUTPATIENT)
Dept: SCHEDULING | Facility: HOME HEALTH | Age: 67
End: 2022-03-21
Payer: MEDICARE

## 2022-03-21 VITALS
RESPIRATION RATE: 16 BRPM | TEMPERATURE: 98.3 F | HEART RATE: 64 BPM | OXYGEN SATURATION: 98 % | SYSTOLIC BLOOD PRESSURE: 152 MMHG | DIASTOLIC BLOOD PRESSURE: 90 MMHG

## 2022-03-21 PROCEDURE — 3331090001 HH PPS REVENUE CREDIT

## 2022-03-21 PROCEDURE — G0151 HHCP-SERV OF PT,EA 15 MIN: HCPCS

## 2022-03-21 PROCEDURE — 3331090002 HH PPS REVENUE DEBIT

## 2022-03-21 NOTE — Clinical Note
Braulio Klein has completed 5 home health PT sessions with treatments focused on gait training, transfer training, and therapeutic exercise. Pt has demonstrated progress with therapy and is discharged from home health as all goals have been met.

## 2022-03-21 NOTE — HOME HEALTH
SUBJECTIVE: Patient reports he is doing ok. No questions. MEDICATIONS REVIEWED AND UPDATED: Medication reconciliation performed. No medication changes. Pt educated on importance of taking all medications as prescribed. .  WOUNDS: Pt denies any wounds. ROM: LE ROM WFL. STRENGTH: FTSTS 10.6 seconds indicating improved functional strength. (Goal met)  BED MOBILITY: MI (Goal met)  TRANSFERS: Pt MI with sit to stand. Unable to perform car or shower transfers, but patient reports independence. (Goal met)  GAIT: Gait training on even surfaces without an AD. Pt is MI on even/uneven surfaces without a device > 200 feet. (Goal met)  STAIRS: Pt is MI on stairs with use of cane. (goal met)  BALANCE/NEUROMUSCULAR REEDUCATION:  TUG 7.04 seconds and Tinetti 26/28 indicating reduced risk of falls. (Goal met)  . PATIENT/CAREGIVER EDUCATION PROVIDED THIS VISIT: safety, HEP, walking, fall prevention, and goal progress. HEP consisting of: 1. Continue existing HEP. Patient level of understanding of education provided:  Pt able to return demonstration of safe transfer techniques. Patient response to treatment: Pt without any signs/symptoms of distress. Mikey Mc PROGRESS: Nima Olvera has completed 5 home health PT sessions with treatments focused on gait training, transfer training, and therapeutic exercise. Pt has demonstrated progress with therapy and is discharged from home health as all goals have been met. PLAN: Discharge patient from [facility] PT as patient has met all goals. .  Dr. Delvin Colunga was called and message was left informing of home health discharge.